# Patient Record
Sex: MALE | Race: WHITE
[De-identification: names, ages, dates, MRNs, and addresses within clinical notes are randomized per-mention and may not be internally consistent; named-entity substitution may affect disease eponyms.]

---

## 2017-03-28 ENCOUNTER — HOSPITAL ENCOUNTER (OUTPATIENT)
Dept: HOSPITAL 74 - FASU | Age: 77
Discharge: HOME | End: 2017-03-28
Attending: OPHTHALMOLOGY
Payer: COMMERCIAL

## 2017-03-28 VITALS — DIASTOLIC BLOOD PRESSURE: 68 MMHG | HEART RATE: 74 BPM | SYSTOLIC BLOOD PRESSURE: 154 MMHG

## 2017-03-28 VITALS — BODY MASS INDEX: 24.4 KG/M2

## 2017-03-28 VITALS — TEMPERATURE: 98.3 F

## 2017-03-28 DIAGNOSIS — H26.8: Primary | ICD-10-CM

## 2017-03-28 PROCEDURE — 08RJ3JZ REPLACEMENT OF RIGHT LENS WITH SYNTHETIC SUBSTITUTE, PERCUTANEOUS APPROACH: ICD-10-PCS | Performed by: OPHTHALMOLOGY

## 2017-03-28 RX ADMIN — PHENYLEPHRINE HYDROCHLORIDE ONE DROP: 0.25 SPRAY NASAL at 07:35

## 2017-03-28 RX ADMIN — CYCLOPENTOLATE HYDROCHLORIDE ONE DROP: 10 SOLUTION/ DROPS OPHTHALMIC at 07:30

## 2017-03-28 RX ADMIN — GENTAMICIN SULFATE ONE DROP: 3 SOLUTION/ DROPS OPHTHALMIC at 07:35

## 2017-03-28 RX ADMIN — FLURBIPROFEN SODIUM ONE DROP: 0.3 SOLUTION/ DROPS OPHTHALMIC at 07:30

## 2017-03-28 RX ADMIN — TROPICAMIDE ONE DROP: 10 SOLUTION/ DROPS OPHTHALMIC at 07:35

## 2017-03-28 RX ADMIN — TROPICAMIDE ONE DROP: 10 SOLUTION/ DROPS OPHTHALMIC at 07:25

## 2017-03-28 RX ADMIN — FLURBIPROFEN SODIUM ONE DROP: 0.3 SOLUTION/ DROPS OPHTHALMIC at 07:25

## 2017-03-28 RX ADMIN — FLURBIPROFEN SODIUM ONE DROP: 0.3 SOLUTION/ DROPS OPHTHALMIC at 07:35

## 2017-03-28 RX ADMIN — GENTAMICIN SULFATE ONE DROP: 3 SOLUTION/ DROPS OPHTHALMIC at 07:15

## 2017-03-28 RX ADMIN — PHENYLEPHRINE HYDROCHLORIDE ONE DROP: 0.25 SPRAY NASAL at 07:20

## 2017-03-28 RX ADMIN — FLURBIPROFEN SODIUM ONE DROP: 0.3 SOLUTION/ DROPS OPHTHALMIC at 07:15

## 2017-03-28 RX ADMIN — CYCLOPENTOLATE HYDROCHLORIDE ONE DROP: 10 SOLUTION/ DROPS OPHTHALMIC at 07:35

## 2017-03-28 RX ADMIN — GENTAMICIN SULFATE ONE DROP: 3 SOLUTION/ DROPS OPHTHALMIC at 07:20

## 2017-03-28 RX ADMIN — PHENYLEPHRINE HYDROCHLORIDE ONE DROP: 0.25 SPRAY NASAL at 07:25

## 2017-03-28 RX ADMIN — CYCLOPENTOLATE HYDROCHLORIDE ONE DROP: 10 SOLUTION/ DROPS OPHTHALMIC at 07:20

## 2017-03-28 RX ADMIN — TROPICAMIDE ONE DROP: 10 SOLUTION/ DROPS OPHTHALMIC at 07:15

## 2017-03-28 RX ADMIN — GENTAMICIN SULFATE ONE DROP: 3 SOLUTION/ DROPS OPHTHALMIC at 07:30

## 2017-03-28 RX ADMIN — CYCLOPENTOLATE HYDROCHLORIDE ONE DROP: 10 SOLUTION/ DROPS OPHTHALMIC at 07:15

## 2017-03-28 RX ADMIN — TROPICAMIDE ONE DROP: 10 SOLUTION/ DROPS OPHTHALMIC at 07:20

## 2017-03-28 RX ADMIN — TROPICAMIDE ONE DROP: 10 SOLUTION/ DROPS OPHTHALMIC at 07:30

## 2017-03-28 RX ADMIN — PHENYLEPHRINE HYDROCHLORIDE ONE DROP: 0.25 SPRAY NASAL at 07:30

## 2017-03-28 RX ADMIN — GENTAMICIN SULFATE ONE DROP: 3 SOLUTION/ DROPS OPHTHALMIC at 07:25

## 2017-03-28 RX ADMIN — PHENYLEPHRINE HYDROCHLORIDE ONE DROP: 0.25 SPRAY NASAL at 07:15

## 2017-03-28 RX ADMIN — FLURBIPROFEN SODIUM ONE DROP: 0.3 SOLUTION/ DROPS OPHTHALMIC at 07:20

## 2017-03-28 RX ADMIN — CYCLOPENTOLATE HYDROCHLORIDE ONE DROP: 10 SOLUTION/ DROPS OPHTHALMIC at 07:25

## 2017-03-28 NOTE — OP
DATE OF OPERATION:  03/28/2017

 

PREOPERATIVE DIAGNOSIS:  Cataract, right eye.  

 

POSTOPERATIVE DIAGNOSIS:  Cataract, right eye.  

 

PROCEDURE:  Cataract extraction via phacoemulsification with insertion of posterior

chamber lens implant, right eye.

 

SURGEON:  Tra Pollock M.D. 

 

ASSISTANT:  Nakia Philippe M.D. 

 

ANESTHESIA:  Regional with sedation.  

 

SPECIMENS:  None.

 

ESTIMATED BLOOD LOSS:  Less than 1 mL.  

 

COMPLICATIONS:  None.

 

PROCEDURE:  The patient is identified in the holding area after all risks, benefits,

and alternatives were explained to the patient, informed consent was obtained.  The

right eye was marked with a marking pen.  The patient then entered the operating room

on an eye stretcher.  After formal timeout was performed, a 3 mL injection of equal

parts 2% lidocaine with epinephrine and 0.5% Marcaine was given around the right eye.

 The right eye was then prepped and draped in the usual sterile fashion.  Eyelid

speculum was placed beneath the eyelids of the right eye.  A supratemporal

paracentesis incision was created using 15 degree blade.  Viscoelastic was injected

into the anterior chamber, and a 2.4-mm keratome blade was then used to make an

infratemporal incision.  A 360-degree continuous curvilinear capsulorrhexis was then

created using bent cystotome and Utrata forceps.  Hydrodissection was performed with

balanced saline solution on the cannula.  Phacoemulsification was then introduced,

disassembled and removed the nucleus in its entirety.  Irrigation/aspiration was then

used to remove any remaining cortical material from the eye.  The capsular bag was

then refilled using viscoelastic.  An Don model [id number] with a power of 22.0

diopters, serial number [id number] was inspected and found to be defect free and

injected into the capsular bag.  Once again, the power was 22.0 diopters. 

Irrigation/aspiration was then used to remove any remaining viscoelastic from the

eye.  The anterior chamber was then reformed using balanced saline solution. 

Intracameral injections of Miochol and Miostat were then given, and the pupil came

down and was round.  All wounds were hydrated with balanced saline solution and noted

to be watertight.  The anterior chamber was deep.  The lens was perfectly centered in

the capsular bag.  There was a red reflex present and the eye had an adequate

pressure.  Then topical antibiotic eyedrops and antibiotic ointment was then

administered to the right eye.  The eyelid speculum was then removed from the right

eye.  The right eye was then patched and shielded.  The patient tolerated the

procedure well and left the operating room in stable condition to follow up in the

eye clinic the next morning at 9 o'clock.  

 

 

TRA POLLOCK M.D.

 

[id number]

DD: 03/28/2017 09:14

DT: 03/28/2017 19:44

Job #:  [id number]

## 2017-05-16 ENCOUNTER — HOSPITAL ENCOUNTER (OUTPATIENT)
Dept: HOSPITAL 74 - FASU | Age: 77
Discharge: HOME | End: 2017-05-16
Attending: OPHTHALMOLOGY
Payer: COMMERCIAL

## 2017-05-16 VITALS — DIASTOLIC BLOOD PRESSURE: 71 MMHG | SYSTOLIC BLOOD PRESSURE: 153 MMHG

## 2017-05-16 VITALS — BODY MASS INDEX: 24.4 KG/M2

## 2017-05-16 VITALS — TEMPERATURE: 97.6 F | HEART RATE: 64 BPM

## 2017-05-16 DIAGNOSIS — H26.8: Primary | ICD-10-CM

## 2017-05-16 PROCEDURE — 08RK3JZ REPLACEMENT OF LEFT LENS WITH SYNTHETIC SUBSTITUTE, PERCUTANEOUS APPROACH: ICD-10-PCS | Performed by: OPHTHALMOLOGY

## 2017-05-16 RX ADMIN — TROPICAMIDE ONE DROP: 10 SOLUTION/ DROPS OPHTHALMIC at 08:20

## 2017-05-16 RX ADMIN — FLURBIPROFEN SODIUM ONE DROP: 0.3 SOLUTION/ DROPS OPHTHALMIC at 08:20

## 2017-05-16 RX ADMIN — PHENYLEPHRINE HYDROCHLORIDE ONE DROP: 0.25 SPRAY NASAL at 08:15

## 2017-05-16 RX ADMIN — FLURBIPROFEN SODIUM ONE DROP: 0.3 SOLUTION/ DROPS OPHTHALMIC at 08:25

## 2017-05-16 RX ADMIN — TROPICAMIDE ONE DROP: 10 SOLUTION/ DROPS OPHTHALMIC at 08:35

## 2017-05-16 RX ADMIN — TROPICAMIDE ONE DROP: 10 SOLUTION/ DROPS OPHTHALMIC at 08:15

## 2017-05-16 RX ADMIN — FLURBIPROFEN SODIUM ONE DROP: 0.3 SOLUTION/ DROPS OPHTHALMIC at 08:30

## 2017-05-16 RX ADMIN — CYCLOPENTOLATE HYDROCHLORIDE ONE DROP: 10 SOLUTION/ DROPS OPHTHALMIC at 08:15

## 2017-05-16 RX ADMIN — CYCLOPENTOLATE HYDROCHLORIDE ONE DROP: 10 SOLUTION/ DROPS OPHTHALMIC at 08:35

## 2017-05-16 RX ADMIN — CYCLOPENTOLATE HYDROCHLORIDE ONE DROP: 10 SOLUTION/ DROPS OPHTHALMIC at 08:30

## 2017-05-16 RX ADMIN — GENTAMICIN SULFATE ONE DROP: 3 SOLUTION/ DROPS OPHTHALMIC at 08:20

## 2017-05-16 RX ADMIN — CYCLOPENTOLATE HYDROCHLORIDE ONE DROP: 10 SOLUTION/ DROPS OPHTHALMIC at 08:20

## 2017-05-16 RX ADMIN — TROPICAMIDE ONE DROP: 10 SOLUTION/ DROPS OPHTHALMIC at 08:25

## 2017-05-16 RX ADMIN — GENTAMICIN SULFATE ONE DROP: 3 SOLUTION/ DROPS OPHTHALMIC at 08:30

## 2017-05-16 RX ADMIN — FLURBIPROFEN SODIUM ONE DROP: 0.3 SOLUTION/ DROPS OPHTHALMIC at 08:15

## 2017-05-16 RX ADMIN — PHENYLEPHRINE HYDROCHLORIDE ONE DROP: 0.25 SPRAY NASAL at 08:20

## 2017-05-16 RX ADMIN — PHENYLEPHRINE HYDROCHLORIDE ONE DROP: 0.25 SPRAY NASAL at 08:25

## 2017-05-16 RX ADMIN — GENTAMICIN SULFATE ONE DROP: 3 SOLUTION/ DROPS OPHTHALMIC at 08:35

## 2017-05-16 RX ADMIN — PHENYLEPHRINE HYDROCHLORIDE ONE DROP: 0.25 SPRAY NASAL at 08:30

## 2017-05-16 RX ADMIN — PHENYLEPHRINE HYDROCHLORIDE ONE DROP: 0.25 SPRAY NASAL at 08:35

## 2017-05-16 RX ADMIN — FLURBIPROFEN SODIUM ONE DROP: 0.3 SOLUTION/ DROPS OPHTHALMIC at 08:35

## 2017-05-16 RX ADMIN — TROPICAMIDE ONE DROP: 10 SOLUTION/ DROPS OPHTHALMIC at 08:30

## 2017-05-16 RX ADMIN — GENTAMICIN SULFATE ONE DROP: 3 SOLUTION/ DROPS OPHTHALMIC at 08:15

## 2017-05-16 RX ADMIN — CYCLOPENTOLATE HYDROCHLORIDE ONE DROP: 10 SOLUTION/ DROPS OPHTHALMIC at 08:25

## 2017-05-16 NOTE — OP
DATE OF OPERATION:  05/16/2017

 

PREOPERATIVE DIAGNOSIS:  Cataract, left eye.

 

POSTOPERATIVE DIAGNOSIS:  Cataract, left eye.

 

PROCEDURE:  Cataract extraction via phacoemulsification with insertion of posterior

chamber lens implant, left eye.

 

SURGEON:  Tra Pollock MD 

 

ASSISTANT:  Nakia Philippe MD 

 

ANESTHESIA:  Regional with sedation.

 

ESTIMATED BLOOD LOSS:  Less than 1 mL.

 

SPECIMENS:  None.

 

COMPLICATIONS:  None.

 

DESCRIPTION OF PROCEDURE:  The patient was identified in the holding area.  After all

risks, benefits, and alternatives were explained to the patient, informed consent was

obtained.  The left eye was marked with a marking pen.  The patient then entered the

operating room on an eye stretcher.  After formal time-out was performed, a 3 mL

injection of equal parts 2% lidocaine with epinephrine and 0.5% Marcaine was given

around the left eye.  The left eye was prepped and draped in the usual sterile

fashion.  An eyelid speculum was placed beneath the eyelid of the left eye.  A

inferotemporal paracentesis incision was created using a15-degree blade. 

Viscoelastic was injected into the anterior chamber.  A 2.4-mm keratome blade was

then used to make a superotemporal incision.  A 360-degree continuous curvilinear

capsulorrhexis was then created using thin cystotome and Utrata forceps. 

Hydrodissection was performed using balanced saline solution on the cannula. 

Phacoemulsification was introduced to disassemble and remove the nucleus in its

entirety.  Irrigation/aspiration was then used to remove any remaining cortical

material from the eye.  The capsular bag was then refilled using viscoelastic.  An

Don Model [id number] with a power of 22.0 diopter serial number [id number] was

inspected and found to be defect free and injected into the capsular bag. 

Irrigation/aspiration was then used to remove any remaining viscoelastic from the

eye.  The anterior chamber was then reformed using balanced saline solution. 

Intracameral injections of Miochol and Miostat were then given into the eye.  All

wounds were hydrated with balanced saline solution and found to be watertight.  The

eye had an adequate pressure.  The anterior chamber was deep.  There was a red reflex

present, and the lens was perfectly centered in the capsular bag.  Topical antibiotic

eyedrops and ointment were then administered to the right eye.  The left eye was

patched and shielded.  The patient tolerated the procedure well and left the

operating room in stable condition to follow up in the eye clinic the following

morning at 9 o'clock.

 

 

 

TRA POLLOCK M.D.

 

[id number]

DD: 05/16/2017 10:27

DT: 05/16/2017 10:50

Job #:  [id number]

## 2018-11-16 PROBLEM — Z00.00 ENCOUNTER FOR PREVENTIVE HEALTH EXAMINATION: Status: ACTIVE | Noted: 2018-11-16

## 2019-01-11 ENCOUNTER — RECORD ABSTRACTING (OUTPATIENT)
Age: 79
End: 2019-01-11

## 2019-01-11 DIAGNOSIS — Z78.9 OTHER SPECIFIED HEALTH STATUS: ICD-10-CM

## 2019-01-11 DIAGNOSIS — M19.012 PRIMARY OSTEOARTHRITIS, RIGHT SHOULDER: ICD-10-CM

## 2019-01-11 DIAGNOSIS — M19.011 PRIMARY OSTEOARTHRITIS, RIGHT SHOULDER: ICD-10-CM

## 2019-01-11 RX ORDER — FLUTICASONE FUROATE AND VILANTEROL TRIFENATATE 100; 25 UG/1; UG/1
100-25 POWDER RESPIRATORY (INHALATION)
Refills: 0 | Status: ACTIVE | COMMUNITY

## 2019-01-11 RX ORDER — ALBUTEROL SULFATE 90 UG/1
108 (90 BASE) AEROSOL, METERED RESPIRATORY (INHALATION)
Refills: 0 | Status: ACTIVE | COMMUNITY

## 2019-02-04 ENCOUNTER — APPOINTMENT (OUTPATIENT)
Dept: INTERNAL MEDICINE | Facility: CLINIC | Age: 79
End: 2019-02-04

## 2019-02-07 ENCOUNTER — APPOINTMENT (OUTPATIENT)
Dept: CARDIOLOGY | Facility: CLINIC | Age: 79
End: 2019-02-07
Payer: MEDICARE

## 2019-02-07 ENCOUNTER — NON-APPOINTMENT (OUTPATIENT)
Age: 79
End: 2019-02-07

## 2019-02-07 VITALS
OXYGEN SATURATION: 98 % | BODY MASS INDEX: 24.25 KG/M2 | HEART RATE: 74 BPM | WEIGHT: 183 LBS | HEIGHT: 73 IN | DIASTOLIC BLOOD PRESSURE: 77 MMHG | TEMPERATURE: 98.1 F | SYSTOLIC BLOOD PRESSURE: 160 MMHG

## 2019-02-07 DIAGNOSIS — Z78.9 OTHER SPECIFIED HEALTH STATUS: ICD-10-CM

## 2019-02-07 DIAGNOSIS — Z13.220 ENCOUNTER FOR SCREENING FOR LIPOID DISORDERS: ICD-10-CM

## 2019-02-07 PROCEDURE — 93000 ELECTROCARDIOGRAM COMPLETE: CPT

## 2019-02-07 PROCEDURE — 99203 OFFICE O/P NEW LOW 30 MIN: CPT

## 2019-02-07 NOTE — REASON FOR VISIT
[Consultation] : a consultation regarding [Hypertension] : hypertension [Palpitations] : palpitations

## 2019-02-08 PROBLEM — Z13.220 LIPID SCREENING: Status: RESOLVED | Noted: 2019-02-08 | Resolved: 2019-02-08

## 2019-02-08 PROBLEM — Z78.9 ALCOHOL USE: Status: ACTIVE | Noted: 2019-02-08

## 2019-02-08 NOTE — HISTORY OF PRESENT ILLNESS
[FreeTextEntry1] : Patient has no history of myocardial infarction or congestive heart failure or chest pain syndrome \par \par He however has had intermittent palpitations, usually when stressed.  He drinks 4 cups of coffee a day.\par He also describes being anxious at times with shaking hands. He said he's taken his wife's Xanax (w/o her knowing) and usually feels better\par \par Has no chest pain, shortness of breath\par No DAVY, PND or orthopnea\par \par Referring MD:  Dr Marianne Hernandez

## 2019-02-08 NOTE — PHYSICAL EXAM
[General Appearance - Well Developed] : well developed [General Appearance - Well Nourished] : well nourished [Normal Conjunctiva] : the conjunctiva exhibited no abnormalities [Heart Rate And Rhythm] : heart rate and rhythm were normal [Heart Sounds] : normal S1 and S2 [Murmurs] : no murmurs present [Auscultation Breath Sounds / Voice Sounds] : lungs were clear to auscultation bilaterally [Bowel Sounds] : normal bowel sounds [Abdomen Soft] : soft [Abnormal Walk] : normal gait [Skin Color & Pigmentation] : normal skin color and pigmentation [Oriented To Time, Place, And Person] : oriented to person, place, and time [FreeTextEntry1] : No JVD or bruits

## 2019-02-08 NOTE — DISCUSSION/SUMMARY
[FreeTextEntry1] : 1.  Palpitations\par Intermitttent palpitations, w/o real associated symptoms\par Normal TSH on 2/3/19\par May be anxiety-related, exacerbated by caffeine\par Rec:\par Reduce caffeine\par Holter for 24 hrs\par Echocardiogram to assess EF\par Return after above\par Consider anxiolytics\par \par 2.  PVCs\par PVCs on EKG from 12/18\par Rec:\par Echo and holter as mentioned\par Will consider stress testing at next visit\par \par 3.  HTN\par Uncontrolled recently, but wasn’'t taking meds adequately -> BP was 191/80\par BP today better, though not optimal\par Rec:\par Same meds\par Follow  BP at next visit\par \par

## 2019-02-19 ENCOUNTER — APPOINTMENT (OUTPATIENT)
Dept: CARDIOLOGY | Facility: CLINIC | Age: 79
End: 2019-02-19
Payer: MEDICARE

## 2019-02-19 PROCEDURE — 93306 TTE W/DOPPLER COMPLETE: CPT

## 2019-03-01 ENCOUNTER — APPOINTMENT (OUTPATIENT)
Dept: CARDIOLOGY | Facility: CLINIC | Age: 79
End: 2019-03-01

## 2019-03-19 ENCOUNTER — RX CHANGE (OUTPATIENT)
Age: 79
End: 2019-03-19

## 2019-04-26 ENCOUNTER — APPOINTMENT (OUTPATIENT)
Dept: UROLOGY | Facility: CLINIC | Age: 79
End: 2019-04-26
Payer: MEDICARE

## 2019-04-26 ENCOUNTER — RESULT REVIEW (OUTPATIENT)
Age: 79
End: 2019-04-26

## 2019-04-26 VITALS
HEIGHT: 73 IN | SYSTOLIC BLOOD PRESSURE: 174 MMHG | DIASTOLIC BLOOD PRESSURE: 80 MMHG | WEIGHT: 182 LBS | BODY MASS INDEX: 24.12 KG/M2 | HEART RATE: 80 BPM

## 2019-04-26 DIAGNOSIS — Z85.038 PERSONAL HISTORY OF OTHER MALIGNANT NEOPLASM OF LARGE INTESTINE: ICD-10-CM

## 2019-04-26 LAB
BACTERIA: 0
BILIRUB UR QL STRIP: NORMAL
CASTS: 0
CLARITY UR: CLEAR
COLLECTION METHOD: NORMAL
CRYSTALS: 0
DATE COLLECTED: NORMAL
EPITHELIAL CELLS: 0
GLUCOSE UR-MCNC: NORMAL
HCG UR QL: NORMAL EU/DL
HEMOCCULT SP1 STL QL: POSITIVE
HGB UR QL STRIP.AUTO: NORMAL
KETONES UR-MCNC: NORMAL
LEUKOCYTE ESTERASE UR QL STRIP: NORMAL
MUCUS: 0
NITRITE UR QL STRIP: NORMAL
PH UR STRIP: 5
PROT UR STRIP-MCNC: NORMAL
QUALITY CONTROL: YES
RBC CASTS # UR COMP ASSIST: NORMAL
SP GR UR STRIP: 1.01
WBC: NORMAL

## 2019-04-26 PROCEDURE — 82270 OCCULT BLOOD FECES: CPT

## 2019-04-26 PROCEDURE — 81000 URINALYSIS NONAUTO W/SCOPE: CPT

## 2019-04-26 PROCEDURE — 99205 OFFICE O/P NEW HI 60 MIN: CPT

## 2019-04-26 NOTE — ASSESSMENT
[FreeTextEntry1] : Dax Kearney is an 78-year-old gentleman who comes to the office today with a chief complaint of nocturia. This may be directly related to the fact that he is drinking before bed. This however does not explain his urinary urgency which appears to be associated with mild prostatic tenderness.  There is no evidence of infection, prostatic congestion, outlet obstruction secondary to BPH, urinary retention et cetera.  For this reason Mr. Kearney has been advised to take ibuprofen 200 mg 3 times a day for the next 5 days.\par \par Mild induration of the prostate gland suggested PSA should be repeated in 3 months.\par \par Also noted on examination today he was guaiac positive stool. The patient has a history of colon cancer with colon resection 15 years ago. He has not had colonoscopy for 8-9 years. GI evaluation has been advised.

## 2019-04-26 NOTE — PHYSICAL EXAM
[General Appearance - Well Nourished] : well nourished [Normal Appearance] : normal appearance [General Appearance - Well Developed] : well developed [Well Groomed] : well groomed [General Appearance - In No Acute Distress] : no acute distress [5th Left ICS - MCL] : palpated at the 5th LICS in the midclavicular line [Irregularly Irregular] : irregularly irregular [Normal Rate] : normal [Normal S1] : normal S1 [Normal S2] : normal S2 [No Murmur] : no murmurs heard [Rt] : varicose veins of the right leg noted [No Pitting Edema] : no pitting edema present [Chest Palpation] : palpation of the chest revealed no abnormalities [Auscultation Breath Sounds / Voice Sounds] : lungs were clear to auscultation bilaterally [Respiration, Rhythm And Depth] : normal respiratory rhythm and effort [Lungs Percussion] : the lungs were normal to percussion [Bowel Sounds] : normal bowel sounds [Abdomen Soft] : soft [Abdomen Tenderness] : non-tender [Abdomen Mass (___ Cm)] : no abdominal mass palpated [Costovertebral Angle Tenderness] : no ~M costovertebral angle tenderness [Size (2+)] : size was 2+ [Abdomen Hernia] : no hernia was discovered [Rectal Exam - Prostate] : was indurated [Prostate Tenderness] : was tender [Rectal Exam - Seminal Vesicles] : was normal [Occult Blood Positive] : exam was positive for occult blood [Rectal Tenderness] : rectal tenderness [Nl Perineum] : perineum was normal on inspection [Normal] : normal [Testes] : normal [Epididymis] : was normal [Normal Station and Gait] : the gait and station were normal for the patient's age [Skin Lesions] : no skin lesions [] : no rash [Oriented To Time, Place, And Person] : oriented to person, place, and time [Affect] : the affect was normal [Mood] : the mood was normal [Not Anxious] : not anxious [No Palpable Adenopathy] : no palpable adenopathy [No Focal Deficits] : no focal deficits [FreeTextEntry1] : HEALTHY FIT LOOKING 77YO MAN [Prostate Fluctuant] : was not fluctuant [Prostate Hard Area Or Nodule Bilaterally] : had no palpable nodules [Mass___cm] : no rectal masses [Phimosis] : no phimosis [Discharge] : no discharge [Circumcised] : the penis was uncircumcised

## 2019-04-26 NOTE — HISTORY OF PRESENT ILLNESS
[FreeTextEntry1] : (New Patient)\par \par Dax Kearney is a 78y who comes to the office today with a chief complaint of nocturia x3 for the past year or more.   He also appears to have urinary urgency for the past 6-8 months.  Incontinence is not a problem nor is frequency.\par \par PERTINENT UROLOGIC HISTORY: \par \par 03/05/19: PSA 2.66; free PSA of 18%\par \par CURRENT UROLOGIC REVIEW OF SYSTEMS:\par \par Incontinence Assessed?.  YES\par \par Nocturia:  (+) 2-4, AVERAGE 3 x/night DRINKING BEFORE BED (WAS ADVISED TO DO SO BY MD AT OOD)\par Frequency: (-)  3-4   x/day    \par Dysuria: (-)\par Urgency:  (+)  X 6-8 MONTH\par Hesitancy:  (-)\par Intermittency:  (-)\par Sensation of Incomplete Voiding :  (-)\par Double voiding :  (-)\par Stress incontinence:  (-)\par Urge incontinence:  (+)  SMALL VOLUME 1-2X/ MONTH\par Use of absorbent pads:  (-)\par Terminal dribbling:  (-)\par Status of stream: "STRONG"\par Venereal disease:  (-)\par Kidney stones:  (-)\par UTIs:  (-)\par Prior urologic surgery:  (-)\par Hematuria:  (-)\par Abdominal pressure:  (-)\par Back pain:  (-)\par Sexual Status: erections occur but not firm enough for penetration; problematic for at least 1 year. Tried Viagra in the past ("1/2  of 100mg pill" but can't tell me if the medication worked)\par Gout:  (-)\par Renal problems:  (-)\par Family History of Urologic Problems:  (-)\par

## 2019-04-28 LAB — BACTERIA UR CULT: NORMAL

## 2019-06-12 ENCOUNTER — APPOINTMENT (OUTPATIENT)
Dept: GASTROENTEROLOGY | Facility: CLINIC | Age: 79
End: 2019-06-12

## 2019-07-05 ENCOUNTER — LABORATORY RESULT (OUTPATIENT)
Age: 79
End: 2019-07-05

## 2019-07-05 ENCOUNTER — APPOINTMENT (OUTPATIENT)
Dept: PULMONOLOGY | Facility: CLINIC | Age: 79
End: 2019-07-05
Payer: MEDICARE

## 2019-07-05 VITALS
WEIGHT: 179 LBS | BODY MASS INDEX: 23.72 KG/M2 | SYSTOLIC BLOOD PRESSURE: 138 MMHG | OXYGEN SATURATION: 96 % | DIASTOLIC BLOOD PRESSURE: 80 MMHG | HEIGHT: 73 IN | HEART RATE: 83 BPM

## 2019-07-05 PROCEDURE — 99205 OFFICE O/P NEW HI 60 MIN: CPT | Mod: 25

## 2019-07-05 PROCEDURE — 94010 BREATHING CAPACITY TEST: CPT

## 2019-07-05 NOTE — PHYSICAL EXAM
[General Appearance - In No Acute Distress] : no acute distress [Low Lying Soft Palate] : low lying soft palate [Elongated Uvula] : elongated uvula [IV] : IV [Erythema] : erythema of the pharynx [Enlarged Base of the Tongue] : enlargement of the base of the tongue [] : the neck was supple [Neck Appearance] : the appearance of the neck was normal [Jugular Venous Distention Increased] : there was no jugular-venous distention [Heart Sounds] : normal S1 and S2 [Murmurs] : no murmurs present [Respiration, Rhythm And Depth] : normal respiratory rhythm and effort [Exaggerated Use Of Accessory Muscles For Inspiration] : no accessory muscle use [Scoliosis] : scoliosis [Bowel Sounds] : normal bowel sounds [Abdomen Soft] : soft [Nail Clubbing] : no clubbing of the fingernails [Abnormal Walk] : normal gait [Cyanosis, Localized] : no localized cyanosis [Skin Color & Pigmentation] : normal skin color and pigmentation [Cranial Nerves] : cranial nerves 2-12 were intact [No Focal Deficits] : no focal deficits [Oriented To Time, Place, And Person] : oriented to person, place, and time [Affect] : the affect was normal [FreeTextEntry1] : no edema

## 2019-07-05 NOTE — CONSULT LETTER
[FreeTextEntry1] : Thank you for allowing me to consult on [facility]  for Asthma/COPD.  Please see my note below.\par \par   [___] : [unfilled] [FreeTextEntry3] : Thank you very much for allowing me to consult on your patient.  If you have any questions, please do not hesitate to contact me.\par \par Sincerely,\par \par Froylan Cruz MD\par Pulmonary and Sleep Medicine\par

## 2019-07-05 NOTE — ASSESSMENT
[FreeTextEntry1] : above was discussed at length with the patient was an excellent understanding of the issues

## 2019-07-05 NOTE — DISCUSSION/SUMMARY
[FreeTextEntry1] : spirometry today: FEV1 [1.73]L  [50]% Predicted [52] FEV1/FVC\par PFTs 4/29/2019 FEV1 1.75 (56% predicted FEV1/FVC 51, no significant change postBD, TLC 93% % RV/% DLCO 70%

## 2019-07-05 NOTE — REVIEW OF SYSTEMS
[Eye Irritation] : ~T irritation of the eyes [Cough] : cough [Sinus Problems] : sinus problems [Dyspnea] : dyspnea [Palpitations] : palpitations [Watery Eyes] : ~T eyes watering / discharge [As Noted in HPI] : as noted in HPI [Snoring] : snoring [Negative] : Pulmonary Hypertension [Sputum] : not coughing up ~M sputum [Postnasal Drip] : no postnasal drip [Chest Tightness] : no chest tightness [Pleuritic Pain] : no pleuritic pain [Wheezing] : no wheezing

## 2019-07-05 NOTE — HISTORY OF PRESENT ILLNESS
[Feelings Of Weakness On Exertion] : denies exercise intolerance [Difficulty Breathing During Exertion] : denies dyspnea on exertion [Wheezing] : denies wheezing [Cough] : denies coughing [Regional Soft Tissue Swelling Both Lower Extremities] : denies lower extremity edema [Fever] : denies fever [Chest Pain Or Discomfort] : denies chest pain [0  -  Nothing at all] : 0, nothing at all [Former] : is a former smoker [Class I - No Symptoms and No Limitations] : I [___ Year Quit] : ~He/She~ quit smoking in [unfilled] [___ Pack Year History] : [unfilled] pack year history [Snoring] : snoring [Wt Gain ___ Lbs] : no recent weight gain [Wt Loss ___ Lbs] : no recent weight loss [de-identified] : 2.5PPD x 15 years [Oxygen] : the patient uses no supplemental oxygen [FreeTextEntry1] : I was asked to consult on this patient by Dr. Hernandez for asthma/COPD. \par The patient is an 79yo [place] M former 45 -pack-year smoker (2.5 PPD X 15 years) quit smoking in 1990 that was a  for 45 years. He claims he breathes well but there are times he is short of breath and he takes his albuterol inhaler which causes palpitations. Using his inhaler once or twice per day but denies any chest pain pressure or tightness and he does not hear himself wheeze. He was hospitalized at [facility] approximately 3 weeks ago for hypertension and he was told then that he had bronchitis. He does have allergic reactions to pollens specifically watery and itchy eyes for which he takes eyedrops. He denies any leg swelling fevers or chills.

## 2019-07-10 LAB
ALBUMIN SERPL ELPH-MCNC: 4.3 G/DL
ALP BLD-CCNC: 77 U/L
ALT SERPL-CCNC: 18 U/L
ANION GAP SERPL CALC-SCNC: 11 MMOL/L
AST SERPL-CCNC: 20 U/L
BASOPHILS # BLD AUTO: 0.04 K/UL
BASOPHILS NFR BLD AUTO: 0.6 %
BILIRUB SERPL-MCNC: 0.6 MG/DL
BUN SERPL-MCNC: 21 MG/DL
CALCIUM SERPL-MCNC: 9.8 MG/DL
CHLORIDE SERPL-SCNC: 106 MMOL/L
CO2 SERPL-SCNC: 25 MMOL/L
CREAT SERPL-MCNC: 0.89 MG/DL
EOSINOPHIL # BLD AUTO: 0.27 K/UL
EOSINOPHIL NFR BLD AUTO: 4.3 %
GLUCOSE SERPL-MCNC: 106 MG/DL
HCT VFR BLD CALC: 48.2 %
HGB BLD-MCNC: 15.3 G/DL
IMM GRANULOCYTES NFR BLD AUTO: 0.2 %
LYMPHOCYTES # BLD AUTO: 1.4 K/UL
LYMPHOCYTES NFR BLD AUTO: 22.4 %
MAN DIFF?: NORMAL
MCHC RBC-ENTMCNC: 31.4 PG
MCHC RBC-ENTMCNC: 31.7 GM/DL
MCV RBC AUTO: 98.8 FL
MONOCYTES # BLD AUTO: 0.5 K/UL
MONOCYTES NFR BLD AUTO: 8 %
NEUTROPHILS # BLD AUTO: 4.03 K/UL
NEUTROPHILS NFR BLD AUTO: 64.5 %
PLATELET # BLD AUTO: 146 K/UL
POTASSIUM SERPL-SCNC: 4.2 MMOL/L
PROT SERPL-MCNC: 6.5 G/DL
RBC # BLD: 4.88 M/UL
RBC # FLD: 12.8 %
SODIUM SERPL-SCNC: 142 MMOL/L
WBC # FLD AUTO: 6.25 K/UL

## 2019-07-18 LAB
A ALTERNATA IGE QN: <0.1 KUA/L
A FUMIGATUS IGE QN: <0.1 KUA/L
BERMUDA GRASS IGE QN: <0.1 KUA/L
BOXELDER IGE QN: <0.1 KUA/L
C HERBARUM IGE QN: <0.1 KUA/L
CALIF WALNUT IGE QN: <0.1 KUA/L
CAT DANDER IGE QN: <0.1 KUA/L
CMN PIGWEED IGE QN: <0.1 KUA/L
COMMON RAGWEED IGE QN: <0.1 KUA/L
COTTONWOOD IGE QN: <0.1 KUA/L
D FARINAE IGE QN: <0.1 KUA/L
D PTERONYSS IGE QN: <0.1 KUA/L
DEPRECATED A ALTERNATA IGE RAST QL: 0
DEPRECATED A FUMIGATUS IGE RAST QL: 0
DEPRECATED BERMUDA GRASS IGE RAST QL: 0
DEPRECATED BOXELDER IGE RAST QL: 0
DEPRECATED C HERBARUM IGE RAST QL: 0
DEPRECATED CAT DANDER IGE RAST QL: 0
DEPRECATED COMMON PIGWEED IGE RAST QL: 0
DEPRECATED COMMON RAGWEED IGE RAST QL: 0
DEPRECATED COTTONWOOD IGE RAST QL: 0
DEPRECATED D FARINAE IGE RAST QL: 0
DEPRECATED D PTERONYSS IGE RAST QL: 0
DEPRECATED DOG DANDER IGE RAST QL: 0
DEPRECATED GOOSEFOOT IGE RAST QL: 0
DEPRECATED LONDON PLANE IGE RAST QL: 0
DEPRECATED MUGWORT IGE RAST QL: 0
DEPRECATED P NOTATUM IGE RAST QL: 0
DEPRECATED RED CEDAR IGE RAST QL: 0
DEPRECATED ROACH IGE RAST QL: NORMAL
DEPRECATED SHEEP SORREL IGE RAST QL: 0
DEPRECATED SILVER BIRCH IGE RAST QL: 0
DEPRECATED TIMOTHY IGE RAST QL: 0
DEPRECATED WHITE ASH IGE RAST QL: 0
DEPRECATED WHITE OAK IGE RAST QL: 0
DOG DANDER IGE QN: <0.1 KUA/L
GOOSEFOOT IGE QN: <0.1 KUA/L
LONDON PLANE IGE QN: <0.1 KUA/L
MUGWORT IGE QN: <0.1 KUA/L
MULBERRY (T70) CLASS: 0
MULBERRY (T70) CONC: <0.1 KUA/L
P NOTATUM IGE QN: <0.1 KUA/L
RED CEDAR IGE QN: <0.1 KUA/L
ROACH IGE QN: 0.18 KUA/L
SHEEP SORREL IGE QN: <0.1 KUA/L
SILVER BIRCH IGE QN: <0.1 KUA/L
TIMOTHY IGE QN: <0.1 KUA/L
TOTAL IGE SMQN RAST: 20 KU/L
TREE ALLERG MIX1 IGE QL: 0
WHITE ASH IGE QN: <0.1 KUA/L
WHITE ELM IGE QN: 0
WHITE ELM IGE QN: <0.1 KUA/L
WHITE OAK IGE QN: <0.1 KUA/L

## 2019-07-19 ENCOUNTER — APPOINTMENT (OUTPATIENT)
Dept: UROLOGY | Facility: CLINIC | Age: 79
End: 2019-07-19

## 2019-07-19 NOTE — HISTORY OF PRESENT ILLNESS
[FreeTextEntry1] : (Last seen 04/26/19)\par \par Dax Kearney is an 78-year-old gentleman first seen in the office on April 26, 2019 when he presented with a with a chief complaint of nocturia. This appeared to be directly related to the fact that he was drinking before bed. This however did not explain his urinary urgency which appeared to be associated with mild prostatic tenderness. There is no evidence of infection, prostatic congestion, significant outlet obstruction or suggestion urinary retention. 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 Kearney was been advised to take ibuprofen 200 mg 3 times a day for  5 days.  Diminish fluid consumption before bed was also advised. Finally the suggestion of mild induration of the prostate gland suggested PSA should be repeated in 3 months.  He returns to the office for this, and reevaluation of his voiding status, today.\par \par Also noted on his last examination was the presence of guaiac positive stool. The patient' s  history of colon cancer with colon resection 15 years earlier, (combined with the fact that he not had colonoscopy for 8-9 years) GI evaluation had also been advised.

## 2019-08-19 ENCOUNTER — APPOINTMENT (OUTPATIENT)
Dept: GASTROENTEROLOGY | Facility: CLINIC | Age: 79
End: 2019-08-19

## 2019-09-06 ENCOUNTER — RESULT REVIEW (OUTPATIENT)
Age: 79
End: 2019-09-06

## 2019-09-09 ENCOUNTER — APPOINTMENT (OUTPATIENT)
Dept: PULMONOLOGY | Facility: CLINIC | Age: 79
End: 2019-09-09
Payer: MEDICARE

## 2019-09-09 VITALS
WEIGHT: 181 LBS | HEART RATE: 69 BPM | HEIGHT: 73 IN | SYSTOLIC BLOOD PRESSURE: 152 MMHG | OXYGEN SATURATION: 99 % | BODY MASS INDEX: 23.99 KG/M2 | DIASTOLIC BLOOD PRESSURE: 80 MMHG

## 2019-09-09 PROCEDURE — 99214 OFFICE O/P EST MOD 30 MIN: CPT

## 2019-09-09 NOTE — ASSESSMENT
[FreeTextEntry1] : above was discussed at length with the patient and explained in [place]iny[place]anda and has an excellent understanding of the issues

## 2019-09-09 NOTE — REVIEW OF SYSTEMS
[Cough] : cough [Dyspnea] : dyspnea [As Noted in HPI] : as noted in HPI [Snoring] : snoring [Negative] : Pulmonary Hypertension [Postnasal Drip] : no postnasal drip [Eye Irritation] : no ~T irritation of the eyes [Sputum] : not coughing up ~M sputum [Chest Tightness] : no chest tightness [Sinus Problems] : no sinus problems [Pleuritic Pain] : no pleuritic pain [Wheezing] : no wheezing [Watery Eyes] : no discharge from the eyes [Palpitations] : no palpitations

## 2019-09-09 NOTE — HISTORY OF PRESENT ILLNESS
[Improved] : have improved [Difficulty Breathing During Exertion] : denies dyspnea on exertion [Feelings Of Weakness On Exertion] : denies exercise intolerance [Cough] : denies coughing [Wheezing] : denies wheezing [Regional Soft Tissue Swelling Both Lower Extremities] : denies lower extremity edema [Fever] : denies fever [Chest Pain Or Discomfort] : denies chest pain [0  -  Nothing at all] : 0, nothing at all [Class I - No Symptoms and No Limitations] : I [___ Year Quit] : ~He/She~ quit smoking in [unfilled] [___ Pack Year History] : [unfilled] pack year history [Snoring] : snoring [Wt Gain ___ Lbs] : no recent weight gain [Wt Loss ___ Lbs] : no recent weight loss [Oxygen] : the patient uses no supplemental oxygen [de-identified] : 2.5PPD x 15 years [FreeTextEntry1] : The patient returned today for f/u of his SOB/ COPD. He is on Breo because trelegy was too expensive. His cough and shortness of breath are better. He is now walking a mile. His throat pain has resolved bu the still has palpitations when he uses proventil which he uses 1x/day. He had a chest x-ray this morning

## 2020-10-13 ENCOUNTER — APPOINTMENT (OUTPATIENT)
Dept: PULMONOLOGY | Facility: CLINIC | Age: 80
End: 2020-10-13
Payer: MEDICARE

## 2020-10-13 VITALS
HEART RATE: 81 BPM | TEMPERATURE: 97.4 F | DIASTOLIC BLOOD PRESSURE: 80 MMHG | WEIGHT: 178 LBS | BODY MASS INDEX: 23.59 KG/M2 | HEIGHT: 73 IN | OXYGEN SATURATION: 97 % | SYSTOLIC BLOOD PRESSURE: 164 MMHG

## 2020-10-13 PROCEDURE — 99214 OFFICE O/P EST MOD 30 MIN: CPT

## 2020-10-13 NOTE — PHYSICAL EXAM
[No Acute Distress] : no acute distress [Low Lying Soft Palate] : low lying soft palate [Erythema] : erythema [Normal Appearance] : normal appearance [No Neck Mass] : no neck mass [Normal Rate/Rhythm] : normal rate/rhythm [Normal S1, S2] : normal s1, s2 [No Murmurs] : no murmurs [Rhonchi] : rhonchi [No Abnormalities] : no abnormalities [Benign] : benign [Normal Gait] : normal gait [No Clubbing] : no clubbing [No Cyanosis] : no cyanosis [No Edema] : no edema [FROM] : FROM [Normal Color/ Pigmentation] : normal color/ pigmentation [No Focal Deficits] : no focal deficits [Oriented x3] : oriented x3 [Normal Affect] : normal affect [TextBox_68] : ant but clear posteriorly

## 2020-10-13 NOTE — HISTORY OF PRESENT ILLNESS
[Former] : former [>= 30 pack years] : >= 30 pack years [Never] : never [TextBox_4] : Patient has been on BREO because insurance would not pay for TRELEGY. Despite being on BREO he still requires albuterol often especially in the morning. Once he starts to walk he feels much better he denies any weight gain he has occasional morning phlegm that's white but denies any chest pain pressure or tightness. He notices drinking beer triggers his shortness of breath but he can have 2 whiskeys without difficulty. He treated his ability keep the weight down by walking so much [TextBox_11] : 2.5 [TextBox_13] : 15 [YearQuit] : 1990

## 2021-03-02 ENCOUNTER — NON-APPOINTMENT (OUTPATIENT)
Age: 81
End: 2021-03-02

## 2021-03-03 ENCOUNTER — NON-APPOINTMENT (OUTPATIENT)
Age: 81
End: 2021-03-03

## 2021-03-03 ENCOUNTER — APPOINTMENT (OUTPATIENT)
Dept: CARDIOLOGY | Facility: CLINIC | Age: 81
End: 2021-03-03
Payer: MEDICARE

## 2021-03-03 VITALS
RESPIRATION RATE: 12 BRPM | HEART RATE: 77 BPM | TEMPERATURE: 98 F | DIASTOLIC BLOOD PRESSURE: 70 MMHG | OXYGEN SATURATION: 97 % | WEIGHT: 183 LBS | BODY MASS INDEX: 24.14 KG/M2 | SYSTOLIC BLOOD PRESSURE: 154 MMHG

## 2021-03-03 DIAGNOSIS — N52.9 MALE ERECTILE DYSFUNCTION, UNSPECIFIED: ICD-10-CM

## 2021-03-03 DIAGNOSIS — Z87.09 PERSONAL HISTORY OF OTHER DISEASES OF THE RESPIRATORY SYSTEM: ICD-10-CM

## 2021-03-03 DIAGNOSIS — Z87.898 PERSONAL HISTORY OF OTHER SPECIFIED CONDITIONS: ICD-10-CM

## 2021-03-03 DIAGNOSIS — Z78.9 OTHER SPECIFIED HEALTH STATUS: ICD-10-CM

## 2021-03-03 DIAGNOSIS — N40.1 BENIGN PROSTATIC HYPERPLASIA WITH LOWER URINARY TRACT SYMPMS: ICD-10-CM

## 2021-03-03 DIAGNOSIS — R19.5 OTHER FECAL ABNORMALITIES: ICD-10-CM

## 2021-03-03 DIAGNOSIS — B02.29 OTHER POSTHERPETIC NERVOUS SYSTEM INVOLVEMENT: ICD-10-CM

## 2021-03-03 DIAGNOSIS — Z87.891 PERSONAL HISTORY OF NICOTINE DEPENDENCE: ICD-10-CM

## 2021-03-03 PROCEDURE — 99214 OFFICE O/P EST MOD 30 MIN: CPT

## 2021-03-03 PROCEDURE — 93242 EXT ECG>48HR<7D RECORDING: CPT

## 2021-03-03 PROCEDURE — 99072 ADDL SUPL MATRL&STAF TM PHE: CPT

## 2021-03-03 PROCEDURE — 93000 ELECTROCARDIOGRAM COMPLETE: CPT | Mod: 59

## 2021-03-04 PROBLEM — Z87.898 HISTORY OF NOCTURIA: Status: RESOLVED | Noted: 2019-04-26 | Resolved: 2021-03-04

## 2021-03-04 PROBLEM — N52.9 ORGANIC ERECTILE DYSFUNCTION: Status: RESOLVED | Noted: 2019-04-26 | Resolved: 2021-03-04

## 2021-03-04 PROBLEM — B02.29 POST HERPETIC NEURALGIA: Status: RESOLVED | Noted: 2021-03-04 | Resolved: 2021-03-04

## 2021-03-04 PROBLEM — Z87.898 HISTORY OF URINARY URGENCY: Status: RESOLVED | Noted: 2019-04-26 | Resolved: 2021-03-04

## 2021-03-04 PROBLEM — Z78.9 CAFFEINE USE: Status: ACTIVE | Noted: 2021-03-04

## 2021-03-04 PROBLEM — Z87.891 FORMER SMOKER: Status: ACTIVE | Noted: 2019-01-11

## 2021-03-04 PROBLEM — Z87.09 HISTORY OF ASTHMA: Status: RESOLVED | Noted: 2019-01-11 | Resolved: 2021-03-04

## 2021-03-04 PROBLEM — R19.5 GUAIAC POSITIVE STOOLS: Status: RESOLVED | Noted: 2019-04-26 | Resolved: 2021-03-04

## 2021-03-04 PROBLEM — N40.1 BENIGN PROSTATIC HYPERPLASIA WITH LOWER URINARY TRACT SYMPTOMS: Status: RESOLVED | Noted: 2019-04-26 | Resolved: 2021-03-04

## 2021-03-04 PROCEDURE — 93244 EXT ECG>48HR<7D REV&INTERPJ: CPT

## 2021-03-04 PROCEDURE — 99072 ADDL SUPL MATRL&STAF TM PHE: CPT

## 2021-03-04 RX ORDER — TAMSULOSIN HYDROCHLORIDE 0.4 MG/1
0.4 CAPSULE ORAL
Refills: 0 | Status: ACTIVE | COMMUNITY

## 2021-03-04 NOTE — PHYSICAL EXAM
[General Appearance - Well Developed] : well developed [General Appearance - Well Nourished] : well nourished [General Appearance - In No Acute Distress] : no acute distress [Normal Conjunctiva] : the conjunctiva exhibited no abnormalities [No Oral Cyanosis] : no oral cyanosis [Normal Rate] : normal [Premature Beats] : regular with premature beats [Normal S1] : normal S1 [Normal S2] : normal S2 [No Murmur] : no murmurs heard [2+] : left 2+ [No Pitting Edema] : no pitting edema present [] : no respiratory distress [Respiration, Rhythm And Depth] : normal respiratory rhythm and effort [Auscultation Breath Sounds / Voice Sounds] : lungs were clear to auscultation bilaterally [Abnormal Walk] : normal gait [Nail Clubbing] : no clubbing of the fingernails [Cyanosis, Localized] : no localized cyanosis [Oriented To Time, Place, And Person] : oriented to person, place, and time [Affect] : the affect was normal [Mood] : the mood was normal [FreeTextEntry1] : No JVD present [S3] : no S3 [S4] : no S4 [Right Carotid Bruit] : no bruit heard over the right carotid [Left Carotid Bruit] : no bruit heard over the left carotid

## 2021-03-04 NOTE — ASSESSMENT
[FreeTextEntry1] : 81 yo male with hypertension, who was referred for evaluation of reported "atrial fibrillation" per 3/1/21 ECG at [facility]. Patient with intermittent palpitations and known history of PVCs. \par ECG from 3/1/21 at [facility] was reviewed today and DOES NOT show atrial fibrillation, but rather sinus rhythm with PACs.\par ECG today demonstrated sinus rhythm with RBBB, PVC, PAC, and LAD.\par CMP and TSH on 3/1/21 was normal.\par \par Zio XT monitor was placed in the office today to evaluate for arrhythmias over 48 hours & determine PVC burden.\par Will perform echocardiogram to assess LV function and structural heart disease.\par Pending test results, will determine if further cardiac work-up or intervention is clinically indicated.\par Patient was advised to reduce his caffeine consumption as this is likely contributing to his palpitations.\par \par BP is mildly elevated today.\par Patient was advised to adhere to low salt diet and reduce caffein consumption.\par Will continue to monitor on amlodipine 10 mg po daily and lisinopril 40 mg po daily.

## 2021-03-04 NOTE — HISTORY OF PRESENT ILLNESS
[FreeTextEntry1] : 81 yo male with hypertension who presents today as referral by PMD for evaluation of reported "atrial fibrillation" per 3/1/21 ECG at [facility]. Patient reports occasional palpitations and has known history of PVCs. He drinks 2-3 cups of espresso daily. Patient denies chest pain, dyspnea, lightheadedness, fatigue, syncope, edema, melena, hematochezia, or hematemesis.\par \par PMD: Adrian Paz MD ([facility])

## 2021-03-04 NOTE — REASON FOR VISIT
[Follow-Up - Clinic] : a clinic follow-up of [Palpitations] : palpitations [FreeTextEntry1] : reported "atrial fibrillation" per 3/1/21 ECG at [facility]

## 2021-03-12 ENCOUNTER — NON-APPOINTMENT (OUTPATIENT)
Age: 81
End: 2021-03-12

## 2021-04-18 ENCOUNTER — RESULT REVIEW (OUTPATIENT)
Age: 81
End: 2021-04-18

## 2021-05-17 ENCOUNTER — APPOINTMENT (OUTPATIENT)
Dept: CARDIOLOGY | Facility: CLINIC | Age: 81
End: 2021-05-17

## 2021-05-25 ENCOUNTER — APPOINTMENT (OUTPATIENT)
Dept: CARDIOLOGY | Facility: CLINIC | Age: 81
End: 2021-05-25

## 2021-05-27 NOTE — HISTORY OF PRESENT ILLNESS
[FreeTextEntry1] : 81 yo male with hypertension who presents today as referral by PMD for evaluation of reported "atrial fibrillation" per 3/1/21 ECG at [facility]. Patient reports occasional palpitations and has known history of PVCs. He drinks 2-3 cups of espresso daily. Patient denies chest pain, dyspnea, lightheadedness, fatigue, syncope, edema, melena, hematochezia, or hematemesis.\par \par PMD: Adrian Paz MD ([facility])

## 2021-05-27 NOTE — ASSESSMENT
[FreeTextEntry1] : 79 yo male with hypertension, who was referred for evaluation of reported "atrial fibrillation" per 3/1/21 ECG at [facility]. Patient with intermittent palpitations and known history of PVCs. \par ECG from 3/1/21 at [facility] was reviewed today and DOES NOT show atrial fibrillation, but rather sinus rhythm with PACs.\par ECG today demonstrated sinus rhythm with RBBB, PVC, PAC, and LAD.\par CMP and TSH on 3/1/21 was normal.\par \par Zio XT monitor was placed in the office today to evaluate for arrhythmias over 48 hours & determine PVC burden.\par Will perform echocardiogram to assess LV function and structural heart disease.\par Pending test results, will determine if further cardiac work-up or intervention is clinically indicated.\par Patient was advised to reduce his caffeine consumption as this is likely contributing to his palpitations.\par \par BP is mildly elevated today.\par Patient was advised to adhere to low salt diet and reduce caffeine consumption.\par Will continue to monitor on amlodipine 10 mg po daily and lisinopril 40 mg po daily.

## 2021-05-27 NOTE — CARDIOLOGY SUMMARY
[de-identified] : Zio XT monitor (3/3-3/4/21): \par 1) Sinus rhythm with average HR 71 bpm (range  bpm). \par 2) Nine non-sustained runs of supraventricular tachycardia. The longest runs was ~ 20 seconds with max HR of 171 bpm. \par 3) Frequent PACs. Rare atrial couplets/triplets.\par 4) Occasional PVCs and rare ventricular couplets.\par \par 3/3/21 ECG: Sinus, rate 77 bpm, PVC, PAC, RBBB, LAD\par 3/1/21 ECG (from [facility]): Reported as "Atrial fibrillation" but is really sinus rhythm with PACs, RBBB, LAFB\par  [de-identified] : 4/19/21 Echo (at [facility]): MIldly increased LV size. Low normal LV systolic function. LVEF 50-55%. Grade II diastolic dysfunction. LA volume index 42 ml/m2. MV thickening and MAC. Mild to mod MR. AV sclerosis. Mild to mod AR. Mild TR. Mild IN. Normal PASP. Mildly dilated aortic root (3.9 cm).\par \par 2/19/19 Echo: Normal LV size and systolic function, LVEF 60%. Grade II DD. Mild conc LVH. Severely increased LA volume index. Mild AV sclerosis. Mod AR. Mild MR. Mild TR.

## 2021-08-12 ENCOUNTER — NON-APPOINTMENT (OUTPATIENT)
Age: 81
End: 2021-08-12

## 2021-08-17 ENCOUNTER — APPOINTMENT (OUTPATIENT)
Dept: CARDIOLOGY | Facility: CLINIC | Age: 81
End: 2021-08-17
Payer: MEDICARE

## 2021-08-17 VITALS
RESPIRATION RATE: 12 BRPM | HEIGHT: 72 IN | OXYGEN SATURATION: 98 % | WEIGHT: 183 LBS | HEART RATE: 100 BPM | SYSTOLIC BLOOD PRESSURE: 168 MMHG | BODY MASS INDEX: 24.79 KG/M2 | DIASTOLIC BLOOD PRESSURE: 88 MMHG

## 2021-08-17 DIAGNOSIS — Z86.79 PERSONAL HISTORY OF OTHER DISEASES OF THE CIRCULATORY SYSTEM: ICD-10-CM

## 2021-08-17 DIAGNOSIS — I49.3 VENTRICULAR PREMATURE DEPOLARIZATION: ICD-10-CM

## 2021-08-17 DIAGNOSIS — Z87.898 PERSONAL HISTORY OF OTHER SPECIFIED CONDITIONS: ICD-10-CM

## 2021-08-17 PROCEDURE — 99214 OFFICE O/P EST MOD 30 MIN: CPT

## 2021-08-17 PROCEDURE — 93000 ELECTROCARDIOGRAM COMPLETE: CPT

## 2021-08-17 NOTE — HISTORY OF PRESENT ILLNESS
[FreeTextEntry1] : 82 yo male with hypertension who presents today for evaluation of vertigo over the past 2 weeks for which he has been taking meclizine 25 mg without significant relief. Patient denies chest pain, dyspnea, palpitations, syncope, edema, melena, hematochezia, or hematemesis.\par \par PMD: Adrian Paz MD ([facility])

## 2021-08-17 NOTE — CARDIOLOGY SUMMARY
[de-identified] : \par 8/17/21 ECG: Atrial flutter with variable AV block, rate 97 bpm, RBBB+LAFB (bifascicular block)\par 3/3/21 ECG: Sinus, rate 77 bpm, PVC, PAC, RBBB, LAD\par 3/1/21 ECG (from [facility]): Reported as "Atrial fibrillation" but is really sinus rhythm with PACs, RBBB, LAFB\par  [de-identified] : \par 4/19/21 Echo: Mildly increased LV size with low normal systolic function, LVEF 50-55%. Grade II DD. Moderately increased LA volume index (42 ml/m2). Mild to mod MR. AV sclerosis. Mild to mod AR. Mild TR. Mild MN. Mildly dilated aortic root (3.9 cm).\par \par 2/19/19 Echo: Normal LV size and systolic function, LVEF 60%. Grade II DD. Mild conc LVH. Severely increased LA volume index. Mild AV sclerosis. Mod AR. Mild MR. Mild TR.

## 2021-08-17 NOTE — PHYSICAL EXAM
[Well Developed] : well developed [Well Nourished] : well nourished [No Acute Distress] : no acute distress [Normal Conjunctiva] : normal conjunctiva [Normal Venous Pressure] : normal venous pressure [No Carotid Bruit] : no carotid bruit [Normal Rate] : normal [Irregularly Irregular] : irregularly irregular [Normal S1] : normal S1 [Normal S2] : normal S2 [No Murmur] : no murmurs heard [No Pitting Edema] : no pitting edema present [2+] : left 2+ [Clear Lung Fields] : clear lung fields [Good Air Entry] : good air entry [No Respiratory Distress] : no respiratory distress  [Soft] : abdomen soft [Non Tender] : non-tender [No Masses/organomegaly] : no masses/organomegaly [Normal Bowel Sounds] : normal bowel sounds [Normal Gait] : normal gait [No Edema] : no edema [No Cyanosis] : no cyanosis [No Clubbing] : no clubbing [No Varicosities] : no varicosities [No Rash] : no rash [No Skin Lesions] : no skin lesions [Moves all extremities] : moves all extremities [No Focal Deficits] : no focal deficits [Normal Speech] : normal speech [Alert and Oriented] : alert and oriented [Normal memory] : normal memory [S3] : no S3 [S4] : no S4 [Right Carotid Bruit] : no bruit heard over the right carotid [Left Carotid Bruit] : no bruit heard over the left carotid

## 2021-08-17 NOTE — ASSESSMENT
[FreeTextEntry1] : 82 yo male with hypertension who presents today for evaluation of vertigo over the past 2 weeks.\par ECG today shows new atrial flutter with variable AV block, but rate controlled.\par \par Will continue metoprolol succinate 25 mg po daily for rate control.\par Given [id number] score = 3 (currently), patient will be started on Eliquis 5 mg po bid for thromboembolic prophylaxis.\par Given newly diagnosed atrial fibrillation, patient's recent onset of vertigo 2 weeks ago may be due to possible posterior infarct. Differential diagnosis also includes inner ear/vestibular disorder.\par \par BP is mildly elevated today. However, will continue to monitor on current regimen (amlodipine 10 mg, lisinopril 40 mg, and metoprolol succinate 25 mg) at this time pending neurologic evaluation.\par

## 2021-08-25 ENCOUNTER — APPOINTMENT (OUTPATIENT)
Dept: NEUROLOGY | Facility: CLINIC | Age: 81
End: 2021-08-25
Payer: MEDICARE

## 2021-08-25 PROCEDURE — 99203 OFFICE O/P NEW LOW 30 MIN: CPT | Mod: 95

## 2021-08-30 NOTE — ASSESSMENT
[FreeTextEntry1] : Neurologic examination is reassuring.  \par He likely has benign positional vertigo.\par \par However, because of the intensity of his symptoms and risk factors, I will obtain MRI of brain noncontrast.  I think he would benefit from vestibular therapy as well.\par \par I do not recommend any changes to his medications at this.\par \par Follow-up after therapy.

## 2021-08-30 NOTE — HISTORY OF PRESENT ILLNESS
[Home] : at home, [unfilled] , at the time of the visit. [Medical Office: ([facility])___] : at the medical office located in  [Verbal consent obtained from patient] : the patient, [unfilled] [FreeTextEntry1] : This is an 81-year-old man has been seen in neurologic consultation for evaluation of vertigo.  Patient had onset of vertigo suddenly and without warning.\par He describes the environment spinning around him.  He feels off balance.  This was very intense a few weeks ago.  However he has noticed improvement in the last week or so.  He has no hearing loss.  No tinnitus.  Now the only time he feels the intense spinning sensation and nausea is when he lays down and tilts his head head backward.  No focal or lateralizing neurologic dysfunction no problem with dysarthria or aphasia.  No falls.  Balance is stable.\par \par Of note interpretation is provided by his daughter.

## 2021-08-30 NOTE — PHYSICAL EXAM
[FreeTextEntry1] : Physical examination \par General: No acute distress, Awake, Alert. \par \par Mental status \par Awake, alert, and oriented to person, time and place, Normal attention span and concentration, Recent and remote memory intact, Language intact, Fund of knowledge intact. \par Cranial Nerves \par II: VFF \par III, IV, VI: EOMI. \par V: Facial sensation is normal B/L. \par VII: Facial strength is normal B/L. \par VIII: Gross hearing is intact. \par IX, X: Palate is midline and elevates symmetrically. \par XI: Trapezius normal strength. \par XII: Tongue midline without atrophy or fasciculations. \par \par Motor exam \par normal\par \par Gait \par Normal, mildly kyphotic.  Normal arm swing.\par

## 2021-09-10 ENCOUNTER — RESULT REVIEW (OUTPATIENT)
Age: 81
End: 2021-09-10

## 2021-09-20 ENCOUNTER — NON-APPOINTMENT (OUTPATIENT)
Age: 81
End: 2021-09-20

## 2021-09-20 ENCOUNTER — APPOINTMENT (OUTPATIENT)
Dept: CARDIOLOGY | Facility: CLINIC | Age: 81
End: 2021-09-20
Payer: MEDICARE

## 2021-09-20 VITALS
SYSTOLIC BLOOD PRESSURE: 180 MMHG | WEIGHT: 183 LBS | HEIGHT: 72 IN | HEART RATE: 56 BPM | RESPIRATION RATE: 12 BRPM | TEMPERATURE: 98.6 F | OXYGEN SATURATION: 97 % | DIASTOLIC BLOOD PRESSURE: 76 MMHG | BODY MASS INDEX: 24.79 KG/M2

## 2021-09-20 PROCEDURE — 99214 OFFICE O/P EST MOD 30 MIN: CPT

## 2021-09-20 PROCEDURE — 93000 ELECTROCARDIOGRAM COMPLETE: CPT

## 2021-09-20 NOTE — CARDIOLOGY SUMMARY
[de-identified] : \par 9/20/21 ECG: Atrial fibrillation, rate 74 bpm, RBBB & LAFB (bifascicular block)\par 8/17/21 ECG: Atrial flutter with variable AV block, rate 97 bpm, RBBB+LAFB (bifascicular block)\par 3/3/21 ECG: Sinus, rate 77 bpm, PVC, PAC, RBBB, LAD\par 3/1/21 ECG (from [facility]): Reported as "Atrial fibrillation" but is really sinus rhythm with PACs, RBBB, LAFB\par  [de-identified] : \par 4/19/21 Echo: Mildly increased LV size with low normal systolic function, LVEF 50-55%. Grade II DD. Moderately increased LA volume index (42 ml/m2). Mild to mod MR. AV sclerosis. Mild to mod AR. Mild TR. Mild NH. Mildly dilated aortic root (3.9 cm).\par \par 2/19/19 Echo: Normal LV size and systolic function, LVEF 60%. Grade II DD. Mild conc LVH. Severely increased LA volume index. Mild AV sclerosis. Mod AR. Mild MR. Mild TR.

## 2021-09-20 NOTE — PHYSICAL EXAM
[Well Developed] : well developed [Well Nourished] : well nourished [No Acute Distress] : no acute distress [Normal Conjunctiva] : normal conjunctiva [Normal Venous Pressure] : normal venous pressure [No Carotid Bruit] : no carotid bruit [Normal Rate] : normal [Irregularly Irregular] : irregularly irregular [Normal S1] : normal S1 [Normal S2] : normal S2 [No Murmur] : no murmurs heard [No Pitting Edema] : no pitting edema present [2+] : left 2+ [Clear Lung Fields] : clear lung fields [Good Air Entry] : good air entry [No Respiratory Distress] : no respiratory distress  [Normal Gait] : normal gait [No Edema] : no edema [No Cyanosis] : no cyanosis [No Clubbing] : no clubbing [No Rash] : no rash [No Skin Lesions] : no skin lesions [Moves all extremities] : moves all extremities [No Focal Deficits] : no focal deficits [Normal Speech] : normal speech [Alert and Oriented] : alert and oriented [Normal memory] : normal memory [S3] : no S3 [S4] : no S4 [Right Carotid Bruit] : no bruit heard over the right carotid [Left Carotid Bruit] : no bruit heard over the left carotid

## 2021-09-20 NOTE — HISTORY OF PRESENT ILLNESS
[FreeTextEntry1] : 82 yo male with hypertension and recently diagnosed atrial flutter on 8/17/21 ECG, who presents today for follow-up. He reports that he is currently undergoing vestibular therapy for BPPV. Brain MRI on 910/21 was negative for acute infarct. Patient denies chest pain, dyspnea, palpitations, syncope, edema, melena, hematochezia, or hematemesis. He reports that his SBP at home is 130-150s.\par \par PMD: Adrian Paz MD ([facility])

## 2021-10-12 ENCOUNTER — APPOINTMENT (OUTPATIENT)
Dept: NEUROLOGY | Facility: CLINIC | Age: 81
End: 2021-10-12
Payer: MEDICARE

## 2021-10-12 VITALS
WEIGHT: 183 LBS | TEMPERATURE: 97.1 F | HEIGHT: 72 IN | BODY MASS INDEX: 24.79 KG/M2 | HEART RATE: 72 BPM | DIASTOLIC BLOOD PRESSURE: 81 MMHG | SYSTOLIC BLOOD PRESSURE: 155 MMHG

## 2021-10-12 DIAGNOSIS — R42 DIZZINESS AND GIDDINESS: ICD-10-CM

## 2021-10-12 PROCEDURE — 99214 OFFICE O/P EST MOD 30 MIN: CPT

## 2021-10-12 NOTE — CONSULT LETTER
[Dear  ___] : Dear  [unfilled], [Consult Letter:] : I had the pleasure of evaluating your patient, [unfilled]. [Please see my note below.] : Please see my note below. [FreeTextEntry3] : Sincerely,\par \par Mac Nguyễn M.D.\par

## 2021-10-12 NOTE — ASSESSMENT
[FreeTextEntry1] : Neurologic examination is reassuring.  \par I suspect he had positional vertigo which has now resolved.\par I do not believe his symptoms are vascular.  Regardless, he is already on Eliquis for his atrial fibrillation.\par He has not needed meclizine.\par He does not need further work-up for his symptoms.\par \par He will see me as needed.\par

## 2021-10-12 NOTE — DATA REVIEWED
[de-identified] : \par  [place] MRI Report             Final\par \par No Documents Attached\par \par \par \par \par   [facility]\par                                          [address]\par                                    [place], [place]  [address]\par                                        Department of Radiology\par                                             [phone]\par \par \par Patient Name:      SHUBHAM HASTINGS                Location:       [facility]\par Med Rec #:        [id number]                    Account #:      [id number]\par YOB: 1940                    Ordering:       Joseluis Nguyễn MD\par Age: 81               Sex:    M                 Attending:      Joseluis Nguyễn MD\par PCP:        [facility]\par ______________________________________________________________________________________\par \par Exam Date:      09/10/21\par Exam:         MRI BRAIN\par Order#:       [id number]\par \par \par \par History: VERTIGO.\par \par  MRI brain without contrast  9/10/2021\par \par  Multiplanar MR imaging of brain provided. The MRI study performed on a 1.5 Ronda magnet.\par \par  There is prominence of cortical sulci, fissures and convexity subarachnoid spaces related to\par generalized volume loss. There are multiple scattered foci of T2/FLAIR hyperintensity within the\par periventricular and subcortical white matter which are nonspecific and may relate to small vessel\par ischemic disease/old infarcts. Diffusion imaging reveals no acute infarct. 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 There is no evidence of a\par cerebellopontine angle mass. There is mild bilateral mastoid air cell mucosal disease.\par \par  The distal internal carotid and vertebral basilar artery flow-voids are intact.\par \par  There is no pathologic marrow present. There is status post bilateral cataract surgery. There is\par extensive paranasal sinus mucosal disease. There is hypertrophy of the nasal turbinates.\par \par \par  Impression:\par \par  No acute infarct or hemorrhage.\par \par  Generalized volume loss.\par \par  Mild to moderate T2/FLAIR hyperintensity within white matter may relate to small vessel ischemic\par disease/old infarcts or other etiologies. Mild pontine small vessel ischemic disease.\par \par  Chronic pansinusitis. Bilateral mild mastoid air cell mucosal thickening.\par \par  --- End of Report ---\par \par ***Electronically Signed ***\par -----------------------------------------------\par Chaparro Wright MD              09/11/21 1435\par \par Dictated on 09/11/21\par \par \par Report cc:  Joseluis Nguyễn MD;\par \par  \par \par  Ordered by: JOSELUIS NGUYỄN       Collected/Examined: 10Sep2021 06:53PM       \par Verified by: JOSELUIS NGUYỄN 13Sep2021 09:33AM       \par  Result Communication: No patient communication needed at this time;\par Stage: Final       \par  Performed at: [facility]       Resulted: 11Sep2021 02:35PM       Last Updated: 13Sep2021 09:33AM       Accession: [id number]

## 2021-11-11 ENCOUNTER — LABORATORY RESULT (OUTPATIENT)
Age: 81
End: 2021-11-11

## 2021-11-11 ENCOUNTER — APPOINTMENT (OUTPATIENT)
Dept: PULMONOLOGY | Facility: CLINIC | Age: 81
End: 2021-11-11
Payer: MEDICARE

## 2021-11-11 VITALS
HEART RATE: 70 BPM | WEIGHT: 183 LBS | TEMPERATURE: 96.4 F | SYSTOLIC BLOOD PRESSURE: 142 MMHG | OXYGEN SATURATION: 98 % | HEIGHT: 72 IN | BODY MASS INDEX: 24.79 KG/M2 | DIASTOLIC BLOOD PRESSURE: 70 MMHG

## 2021-11-11 PROCEDURE — 36415 COLL VENOUS BLD VENIPUNCTURE: CPT

## 2021-11-11 PROCEDURE — 99215 OFFICE O/P EST HI 40 MIN: CPT | Mod: 25

## 2021-11-12 LAB
BASOPHILS # BLD AUTO: 0.04 K/UL
BASOPHILS NFR BLD AUTO: 0.5 %
EOSINOPHIL # BLD AUTO: 0.36 K/UL
EOSINOPHIL NFR BLD AUTO: 4.8 %
HCT VFR BLD CALC: 49.7 %
HGB BLD-MCNC: 16.3 G/DL
IMM GRANULOCYTES NFR BLD AUTO: 0.3 %
LYMPHOCYTES # BLD AUTO: 1.75 K/UL
LYMPHOCYTES NFR BLD AUTO: 23.4 %
MAGNESIUM SERPL-MCNC: 2.2 MG/DL
MAN DIFF?: NORMAL
MCHC RBC-ENTMCNC: 31.5 PG
MCHC RBC-ENTMCNC: 32.8 GM/DL
MCV RBC AUTO: 95.9 FL
MONOCYTES # BLD AUTO: 0.56 K/UL
MONOCYTES NFR BLD AUTO: 7.5 %
NEUTROPHILS # BLD AUTO: 4.74 K/UL
NEUTROPHILS NFR BLD AUTO: 63.5 %
PLATELET # BLD AUTO: 165 K/UL
RBC # BLD: 5.18 M/UL
RBC # FLD: 14.3 %
WBC # FLD AUTO: 7.47 K/UL

## 2021-11-15 LAB
A ALTERNATA IGE QN: <0.1 KUA/L
A FUMIGATUS IGE QN: <0.1 KUA/L
BUDGERIGAR FEATHER (E78) CLASS: 0
BUDGERIGAR FEATHER (E78) CLASS: 0
BUDGERIGAR FEATHER (E78) CONC: <0.1 KUA/L
BUDGERIGAR FEATHER (E78) CONC: <0.1 KUA/L
C HERBARUM IGE QN: <0.1 KUA/L
CANARY BIRD FEATHERS (E201) CLASS: 0
CANARY BIRD FEATHERS (E201) CONC: <0.1 KUA/L
CAT (RFEL D) 1 IGE QN: <0.1 KUA/L
CAT (RFEL D) 4 IGE QN: <0.1 KUA/L
CAT (RFEL D) 7 IGE QN: <0.1 KUA/L
CAT SERUM ALB IGE QN: <0.1 KUA/L
CHICKEN FEATHER IGE QN: <0.1 KUA/L
DEPRECATED A ALTERNATA IGE RAST QL: 0
DEPRECATED A FUMIGATUS IGE RAST QL: 0
DEPRECATED C HERBARUM IGE RAST QL: 0
DEPRECATED CAT (RFEL D) 1 IGE RAST QL: 0
DEPRECATED CAT (RFEL D) 4 IGE RAST QL: 0
DEPRECATED CAT (RFEL D) 7 IGE RAST QL: 0
DEPRECATED CAT SERUM ALB IGE RAST QL: 0
DEPRECATED CHICKEN FEATHER IGE RAST QL: 0
DEPRECATED DOG (RCAN F) 1 IGE RAST QL: 0
DEPRECATED DOG (RCAN F) 2 IGE RAST QL: 0
DEPRECATED DOG (RCAN F) 4 IGE RAST QL: 0
DEPRECATED DOG (RCAN F) 5 IGE RAST QL: 0
DEPRECATED DOG (RCAN F) 6 IGE RAST QL: 0
DEPRECATED DOG SERUM ALB IGE RAST QL: 0
DEPRECATED DUCK FEATHER IGE RAST QL: 0
DEPRECATED GOOSE FEATHER IGE RAST QL: 0
DEPRECATED MOUSE EPITH IGE RAST QL: 0
DEPRECATED P NOTATUM IGE RAST QL: 0
DEPRECATED S ROSTRATA IGE RAST QL: 0
DOG (RCAN F) 1 IGE QN: <0.1 KUA/L
DOG (RCAN F) 2 IGE QN: <0.1 KUA/L
DOG (RCAN F) 4 IGE QN: <0.1 KUA/L
DOG (RCAN F) 5 IGE QN: <0.1 KUA/L
DOG (RCAN F) 6 IGE QN: <0.1 KUA/L
DOG SERUM ALB IGE QN: <0.1 KUA/L
DUCK FEATHER IGE QN: <0.1 KUA/L
GOOSE FEATHER IGE QN: <0.1 KUA/L
HORSE (REQU C) 1 IGE QN: 0
HORSE REQU C 1 IGE E227 CONC: <0.1 KUA/L
MOUSE EPITH IGE QN: <0.1 KUA/L
P NOTATUM IGE QN: <0.1 KUA/L
S ROSTRATA IGE QN: <0.1 KUA/L

## 2021-11-15 NOTE — PHYSICAL EXAM
[No Acute Distress] : no acute distress [Low Lying Soft Palate] : low lying soft palate [Normal Appearance] : normal appearance [No Neck Mass] : no neck mass [Normal Rate/Rhythm] : normal rate/rhythm [Normal S1, S2] : normal s1, s2 [No Murmurs] : no murmurs [No Abnormalities] : no abnormalities [Benign] : benign [Normal Gait] : normal gait [No Clubbing] : no clubbing [No Cyanosis] : no cyanosis [No Edema] : no edema [FROM] : FROM [Normal Color/ Pigmentation] : normal color/ pigmentation [No Focal Deficits] : no focal deficits [Oriented x3] : oriented x3 [Normal Affect] : normal affect [Nasal congestion] : nasal congestion [No Rubs] : no rubs [No Gallops] : no gallops [No Resp Distress] : no resp distress [TextBox_11] : inflamed nasal mucosa, erythematous o/p [TextBox_54] : distant [TextBox_68] : diffuse expiratory wheeze

## 2021-11-15 NOTE — HISTORY OF PRESENT ILLNESS
[Former] : former [>= 30 pack years] : >= 30 pack years [Never] : never [TextBox_4] : Patient returns on a f/u for COPD. He has been having recent trouble breathing in the cold weather. He is used to being able to walk 6 mi w/o any CRESPO, but lately w/ the cold, when he walks that same distance he feels tightness and only able to breathe retirement. His albuterol rescue inhaler makes him feel better, but also raises his pulse and makes him feel shaky. He has been using his rescue inhaler 3-4x/day and Breo QAM. He lives w/ 3 birds, a cat, and a dog. He believes he has seasonal allergies to pollen but his last [place] allergy panel was neg. He does not use any nasal saline. [TextBox_11] : 2.5 [TextBox_13] : 15 [YearQuit] : 1990

## 2021-11-29 ENCOUNTER — APPOINTMENT (OUTPATIENT)
Dept: CARDIOLOGY | Facility: CLINIC | Age: 81
End: 2021-11-29
Payer: MEDICARE

## 2021-11-29 VITALS
RESPIRATION RATE: 12 BRPM | BODY MASS INDEX: 25.06 KG/M2 | HEART RATE: 87 BPM | HEIGHT: 72 IN | OXYGEN SATURATION: 99 % | TEMPERATURE: 98.6 F | DIASTOLIC BLOOD PRESSURE: 90 MMHG | WEIGHT: 185 LBS | SYSTOLIC BLOOD PRESSURE: 146 MMHG

## 2021-11-29 PROCEDURE — 99214 OFFICE O/P EST MOD 30 MIN: CPT

## 2021-11-29 PROCEDURE — 36415 COLL VENOUS BLD VENIPUNCTURE: CPT

## 2021-11-29 NOTE — ASSESSMENT
[FreeTextEntry1] : 82 yo male with hypertension, recently diagnosed atrial flutter with variable AV block on 8/17/21. \par \par Will continue metoprolol succinate 25 mg po daily for rate control.\par Given [id number] score = 3 (currently), will continue Eliquis 5 mg po bid for thromboembolic prophylaxis.\par \par BP is elevated today on current regimen of amlodipine 10 mg, lisinopril 40 mg, metoprolol succinate 25 mg, and HCTZ 12.5 mg po daily.\par Will check BMP today. If lytes and renal function are normal, will increase HCTZ to 25 mg po daily with follow-up and labs in 1 month.

## 2021-11-29 NOTE — CARDIOLOGY SUMMARY
[de-identified] : \par 9/20/21 ECG: Atrial fibrillation, rate 74 bpm, RBBB & LAFB (bifascicular block)\par 8/17/21 ECG: Atrial flutter with variable AV block, rate 97 bpm, RBBB+LAFB (bifascicular block)\par 3/3/21 ECG: Sinus, rate 77 bpm, PVC, PAC, RBBB, LAD\par 3/1/21 ECG (from [facility]): Reported as "Atrial fibrillation" but is really sinus rhythm with PACs, RBBB, LAFB\par  [de-identified] : \par 4/19/21 Echo: Mildly increased LV size with low normal systolic function, LVEF 50-55%. Grade II DD. Moderately increased LA volume index (42 ml/m2). Mild to mod MR. AV sclerosis. Mild to mod AR. Mild TR. Mild VA. Mildly dilated aortic root (3.9 cm).\par \par 2/19/19 Echo: Normal LV size and systolic function, LVEF 60%. Grade II DD. Mild conc LVH. Severely increased LA volume index. Mild AV sclerosis. Mod AR. Mild MR. Mild TR.

## 2021-11-29 NOTE — PHYSICAL EXAM
[Well Developed] : well developed [Well Nourished] : well nourished [No Acute Distress] : no acute distress [Normal Conjunctiva] : normal conjunctiva [Normal Venous Pressure] : normal venous pressure [No Carotid Bruit] : no carotid bruit [Normal Rate] : normal [Irregularly Irregular] : irregularly irregular [Normal S1] : normal S1 [Normal S2] : normal S2 [No Murmur] : no murmurs heard [No Pitting Edema] : no pitting edema present [2+] : left 2+ [Clear Lung Fields] : clear lung fields [Good Air Entry] : good air entry [No Respiratory Distress] : no respiratory distress  [Normal Gait] : normal gait [No Edema] : no edema [No Cyanosis] : no cyanosis [No Clubbing] : no clubbing [Moves all extremities] : moves all extremities [No Focal Deficits] : no focal deficits [Normal Speech] : normal speech [Alert and Oriented] : alert and oriented [Normal memory] : normal memory [S3] : no S3 [S4] : no S4 [Right Carotid Bruit] : no bruit heard over the right carotid [Left Carotid Bruit] : no bruit heard over the left carotid

## 2021-11-29 NOTE — HISTORY OF PRESENT ILLNESS
[FreeTextEntry1] : 80 yo male with hypertension and atrial flutter/fibrillation, who presents today for follow-up. Patient denies chest pain, dyspnea, palpitations, syncope, edema, melena, hematochezia, or hematemesis. \par \par PCP: Adrian Paz ([place] [facility])\par Pulm: Froylan Cruz

## 2021-11-30 LAB
ALBUMIN SERPL ELPH-MCNC: 4.4 G/DL
ALP BLD-CCNC: 102 U/L
ALT SERPL-CCNC: 72 U/L
ANION GAP SERPL CALC-SCNC: 13 MMOL/L
AST SERPL-CCNC: 53 U/L
BILIRUB SERPL-MCNC: 0.9 MG/DL
BUN SERPL-MCNC: 21 MG/DL
CALCIUM SERPL-MCNC: 9.7 MG/DL
CHLORIDE SERPL-SCNC: 106 MMOL/L
CO2 SERPL-SCNC: 22 MMOL/L
CREAT SERPL-MCNC: 0.87 MG/DL
GLUCOSE SERPL-MCNC: 104 MG/DL
POTASSIUM SERPL-SCNC: 4.5 MMOL/L
PROT SERPL-MCNC: 6.9 G/DL
SODIUM SERPL-SCNC: 141 MMOL/L

## 2021-12-17 ENCOUNTER — APPOINTMENT (OUTPATIENT)
Dept: PULMONOLOGY | Facility: CLINIC | Age: 81
End: 2021-12-17
Payer: MEDICARE

## 2021-12-17 VITALS
HEART RATE: 66 BPM | SYSTOLIC BLOOD PRESSURE: 132 MMHG | WEIGHT: 181 LBS | HEIGHT: 72 IN | DIASTOLIC BLOOD PRESSURE: 76 MMHG | OXYGEN SATURATION: 98 % | TEMPERATURE: 98.6 F | BODY MASS INDEX: 24.52 KG/M2

## 2021-12-17 DIAGNOSIS — J30.89 OTHER ALLERGIC RHINITIS: ICD-10-CM

## 2021-12-17 DIAGNOSIS — G47.33 OBSTRUCTIVE SLEEP APNEA (ADULT) (PEDIATRIC): ICD-10-CM

## 2021-12-17 DIAGNOSIS — R09.81 NASAL CONGESTION: ICD-10-CM

## 2021-12-17 DIAGNOSIS — R06.83 SNORING: ICD-10-CM

## 2021-12-17 PROCEDURE — 99214 OFFICE O/P EST MOD 30 MIN: CPT

## 2021-12-17 NOTE — CURRENT MEDS
[Takes medication as prescribed] : takes Detail Level: Zone Render Risk Assessment In Note?: no Additional Notes: Pt has obtained baseline bloodwork and imported into chart. Pt to recheck labs at follow up after starting on Accutane. We are currently only waiting for a note from patient’s therapist stating it is ok to proceed with starting accutane before sending patient’s first prescription to [facility]

## 2021-12-20 PROBLEM — R09.81 NASAL CONGESTION: Status: ACTIVE | Noted: 2021-11-11

## 2021-12-20 PROBLEM — J30.89 ENVIRONMENTAL AND SEASONAL ALLERGIES: Status: ACTIVE | Noted: 2019-07-05

## 2021-12-20 PROBLEM — R06.83 SNORING: Status: ACTIVE | Noted: 2019-09-09

## 2021-12-20 PROBLEM — G47.33 OBSTRUCTIVE SLEEP APNEA: Status: ACTIVE | Noted: 2021-12-17

## 2021-12-20 NOTE — PROCEDURE
[FreeTextEntry1] : HST 11/28/2021: AHI=19, lowest O2 sat 87%\par \par CMP 11/29/2021: Glu 104, Ast/Alt 53/72\par \par CBC 11/11/2021: Wbc 7.47, Hgb 16.3, Hct 49.7, Plt 165, eos 4.8%#0.04 \par  Mg 2.2

## 2021-12-20 NOTE — PHYSICAL EXAM
[No Acute Distress] : no acute distress [Low Lying Soft Palate] : low lying soft palate [Nasal congestion] : nasal congestion [Normal Appearance] : normal appearance [No Neck Mass] : no neck mass [Normal Rate/Rhythm] : normal rate/rhythm [Normal S1, S2] : normal s1, s2 [No Murmurs] : no murmurs [No Rubs] : no rubs [No Gallops] : no gallops [No Resp Distress] : no resp distress [No Abnormalities] : no abnormalities [Benign] : benign [Normal Gait] : normal gait [No Clubbing] : no clubbing [No Cyanosis] : no cyanosis [No Edema] : no edema [FROM] : FROM [Normal Color/ Pigmentation] : normal color/ pigmentation [No Focal Deficits] : no focal deficits [Oriented x3] : oriented x3 [Normal Affect] : normal affect [Erythema] : erythema [TextBox_11] : crowded o/p, dry MM [TextBox_68] : + rhonchi, mild expiratory wheeze [TextBox_89] : obese

## 2021-12-20 NOTE — HISTORY OF PRESENT ILLNESS
[Former] : former [>= 30 pack years] : >= 30 pack years [Never] : never [TextBox_4] : Patient returns on a f/u for COPD. He c/o frequent palpitations and coughing at night of moderate severity. He always has to sleep w/ the A/C on at night. He has been using Trelegy 100 w/ the rescue inhaler and can still walk 6 mi w/o CRESPO. He has not smoked at all since quitting completely in 1990. [TextBox_11] : 2.5 [TextBox_13] : 15 [YearQuit] : 1990

## 2022-03-09 ENCOUNTER — RX RENEWAL (OUTPATIENT)
Age: 82
End: 2022-03-09

## 2022-04-14 ENCOUNTER — NON-APPOINTMENT (OUTPATIENT)
Age: 82
End: 2022-04-14

## 2022-04-22 ENCOUNTER — RESULT REVIEW (OUTPATIENT)
Age: 82
End: 2022-04-22

## 2022-04-25 ENCOUNTER — NON-APPOINTMENT (OUTPATIENT)
Age: 82
End: 2022-04-25

## 2022-04-25 ENCOUNTER — APPOINTMENT (OUTPATIENT)
Dept: CARDIOLOGY | Facility: CLINIC | Age: 82
End: 2022-04-25
Payer: MEDICARE

## 2022-04-25 VITALS
WEIGHT: 171 LBS | HEART RATE: 66 BPM | SYSTOLIC BLOOD PRESSURE: 146 MMHG | DIASTOLIC BLOOD PRESSURE: 68 MMHG | BODY MASS INDEX: 23.19 KG/M2 | OXYGEN SATURATION: 99 % | RESPIRATION RATE: 12 BRPM

## 2022-04-25 DIAGNOSIS — I26.99 OTHER PULMONARY EMBOLISM W/OUT ACUTE COR PULMONALE: ICD-10-CM

## 2022-04-25 PROCEDURE — 93000 ELECTROCARDIOGRAM COMPLETE: CPT

## 2022-04-25 PROCEDURE — 99496 TRANSJ CARE MGMT HIGH F2F 7D: CPT

## 2022-04-25 RX ORDER — ATORVASTATIN CALCIUM 80 MG/1
80 TABLET, FILM COATED ORAL DAILY
Refills: 0 | Status: ACTIVE | COMMUNITY
Start: 2022-04-11

## 2022-04-25 NOTE — HISTORY OF PRESENT ILLNESS
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 He was recently hospitalized at [place] from 4/22-4/24/22 when he presented with SOB/orthopnea. CTPA on 4/22/22 reported possible pulmonary emboli in RUL. Echocardiogram on 4/22/22 reported moderately dilated LV with moderate global hypokinesis & LVEF 33%, which is new compared to 4/2021 echo. He was restarted on Eliquis and started on Entresto for new LV systolic dysfunction. Patient denies chest pain, dyspnea, palpitations, syncope, edema, melena, hematochezia, or hematemesis. \par \par PCP: Adrian Paz ([facility])\par Pulm: Froylan Cruz

## 2022-04-25 NOTE — CARDIOLOGY SUMMARY
[de-identified] : \par 4/25/22 ECG: Atrial fibrillation, rate 78 bpm, RBBB & LAFB (bifascicular block)\par 9/20/21 ECG: Atrial fibrillation, rate 74 bpm, RBBB & LAFB (bifascicular block)\par 8/17/21 ECG: Atrial flutter with variable AV block, rate 97 bpm, RBBB+LAFB (bifascicular block)\par 3/3/21 ECG: Sinus, rate 77 bpm, PVC, PAC, RBBB, LAD\par 3/1/21 ECG (from [facility]): Reported as "Atrial fibrillation" but is really sinus rhythm with PACs, RBBB, LAFB\par  [de-identified] : \par 4/22/22 Echo (at [facility]): Moderately dilated LV with moderate global hypokinesis, LVEF 33%. Severe biatrial enlargement. Mild AR. Mild to mod MR. Mod TR. Mild pulm HTN. Dilated aortic root (4.2 cm).\par \par 4/19/21 Echo: Mildly increased LV size with low normal systolic function, LVEF 50-55%. Grade II DD. Moderately increased LA volume index (42 ml/m2). Mild to mod MR. AV sclerosis. Mild to mod AR. Mild TR. Mild SC. Mildly dilated aortic root (3.9 cm).\par \par 2/19/19 Echo: Normal LV size and systolic function, LVEF 60%. Grade II DD. Mild conc LVH. Severely increased LA volume index. Mild AV sclerosis. Mod AR. Mild MR. Mild TR.

## 2022-04-25 NOTE — ASSESSMENT
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 \par \par Given newly diagnosed LV systolic dysfunction per 4/22/22 echo, will perform treadmill nuclear stress test for ischemic evaluation.\par Pending review of test results, will determine if further cardiac work-up or intervention is clinically indicated.\par Patient to start Entresto 24 mg/26 mg po bid today.\par Will continue metoprolol succinate 25 mg po daily.\par \par ECG today demonstrates atrial fibrillation, rate 78 bpm, and bifascicular block (RBBB+LAFB).\par Will continue metoprolol succinate 25 mg po daily for rate control.\par Given [id number] score = 6, will continue Eliquis 5 mg po bid for afib thromboembolic prophylaxis.\par \par Will continue Eliquis for recently diagnosed possible RUL pulmonary emboli. However, patient will continue long-term anticoagulation given recent CVA and atrial fibrillation.\par \par BP is elevated today on amlodipine 10 mg, metoprolol succinate 25 mg, and HCTZ 25 mg po daily.\par Patient to start Entresto today, which will help control BP.

## 2022-04-26 ENCOUNTER — TRANSCRIPTION ENCOUNTER (OUTPATIENT)
Age: 82
End: 2022-04-26

## 2022-05-24 ENCOUNTER — APPOINTMENT (OUTPATIENT)
Dept: CARDIOLOGY | Facility: CLINIC | Age: 82
End: 2022-05-24

## 2022-05-27 ENCOUNTER — RX RENEWAL (OUTPATIENT)
Age: 82
End: 2022-05-27

## 2022-06-10 ENCOUNTER — RESULT REVIEW (OUTPATIENT)
Age: 82
End: 2022-06-10

## 2022-06-15 ENCOUNTER — APPOINTMENT (OUTPATIENT)
Dept: CARDIOLOGY | Facility: CLINIC | Age: 82
End: 2022-06-15
Payer: MEDICARE

## 2022-06-15 VITALS
HEART RATE: 77 BPM | DIASTOLIC BLOOD PRESSURE: 80 MMHG | RESPIRATION RATE: 12 BRPM | WEIGHT: 171 LBS | HEIGHT: 72 IN | SYSTOLIC BLOOD PRESSURE: 159 MMHG | OXYGEN SATURATION: 98 % | BODY MASS INDEX: 23.16 KG/M2

## 2022-06-15 DIAGNOSIS — R94.39 ABNORMAL RESULT OF OTHER CARDIOVASCULAR FUNCTION STUDY: ICD-10-CM

## 2022-06-15 PROCEDURE — 36415 COLL VENOUS BLD VENIPUNCTURE: CPT

## 2022-06-15 PROCEDURE — 99214 OFFICE O/P EST MOD 30 MIN: CPT

## 2022-06-15 NOTE — PHYSICAL EXAM
[Well Developed] : well developed [Well Nourished] : well nourished [No Acute Distress] : no acute distress [Normal Conjunctiva] : normal conjunctiva [Normal Venous Pressure] : normal venous pressure [No Carotid Bruit] : no carotid bruit [Normal Rate] : normal [Irregularly Irregular] : irregularly irregular [Normal S1] : normal S1 [Normal S2] : normal S2 [No Murmur] : no murmurs heard [No Pitting Edema] : no pitting edema present [2+] : left 2+ [Clear Lung Fields] : clear lung fields [Good Air Entry] : good air entry [No Respiratory Distress] : no respiratory distress  [No Edema] : no edema [No Cyanosis] : no cyanosis [No Clubbing] : no clubbing [Moves all extremities] : moves all extremities [No Focal Deficits] : no focal deficits [Normal Speech] : normal speech [Alert and Oriented] : alert and oriented [Normal memory] : normal memory [S3] : no S3 [S4] : no S4 [Right Carotid Bruit] : no bruit heard over the right carotid [Left Carotid Bruit] : no bruit heard over the left carotid

## 2022-06-15 NOTE — CARDIOLOGY SUMMARY
[de-identified] : \par 4/25/22 ECG: Atrial fibrillation, rate 78 bpm, RBBB & LAFB (bifascicular block)\par 9/20/21 ECG: Atrial fibrillation, rate 74 bpm, RBBB & LAFB (bifascicular block)\par 8/17/21 ECG: Atrial flutter with variable AV block, rate 97 bpm, RBBB+LAFB (bifascicular block)\par 3/3/21 ECG: Sinus, rate 77 bpm, PVC, PAC, RBBB, LAD\par 3/1/21 ECG (from [facility]): Reported as "Atrial fibrillation" but is really sinus rhythm with PACs, RBBB, LAFB\par  [de-identified] : \par 6/10/22 Lexiscan Myoview (at [facility]): No CP or ECG changes. Small apical infarct. Dilated LV with moderate global hypokinesis, LVEF 34%.\par  [de-identified] : \par 4/22/22 Echo (at [facility]): Moderately dilated LV with moderate global hypokinesis, LVEF 33%. Severe biatrial enlargement. Mild AR. Mild to mod MR. Mod TR. Mild pulm HTN. Dilated aortic root (4.2 cm).\par \par 4/19/21 Echo: Mildly increased LV size with low normal systolic function, LVEF 50-55%. Grade II DD. Moderately increased LA volume index (42 ml/m2). Mild to mod MR. AV sclerosis. Mild to mod AR. Mild TR. Mild AL. Mildly dilated aortic root (3.9 cm).\par \par 2/19/19 Echo: Normal LV size and systolic function, LVEF 60%. Grade II DD. Mild conc LVH. Severely increased LA volume index. Mild AV sclerosis. Mod AR. Mild MR. Mild TR.

## 2022-06-15 NOTE — ASSESSMENT
[FreeTextEntry1] : 80 yo male with hypertension, atrial flutter/fibrillation (on Eliquis), CVA in early April 2022 s/p thrombectomy at [facility], and possible RUL pulmonary emboli per 4/22/22 CTPA, and recently diagnosed acute systolic heart failure per 4/22/22 echocardiogram, which reported reported moderately dilated LV with moderate global hypokinesis & LVEF 33%, which was new compared to 4/2021 echo. He underwent Lexiscan nuclear stress test on 6/10/22, which reported a small apical infarct and dilated LV with moderate global hypokinesis, LVEF 34%.\par \par Given newly diagnosed acute systolic heart failure and reported apical infarct per 6/10/22 nuclear stress test, will refer patient for cardiac catheterization at [facility] to evaluate for obstructive CAD and possible PCI.\par Pre-cath labs were drawn today.\par Will continue Entresto 24 mg/26 mg po bid and metoprolol succinate 25 mg po daily at this time pending cardiac cath results. \par \par Will continue metoprolol succinate 25 mg po daily for afib rate control.\par Given [id number] score = 6, will continue Eliquis 5 mg po bid for afib thromboembolic prophylaxis.\par \par Will continue Eliquis for recently diagnosed possible RUL pulmonary emboli. However, patient will continue long-term anticoagulation given recent CVA and atrial fibrillation.\par \par Will continue amlodipine 10 mg, metoprolol succinate 25 mg, HCTZ 25 mg po daily, and Entresto for BP control.

## 2022-06-15 NOTE — HISTORY OF PRESENT ILLNESS
[FreeTextEntry1] : 82 yo male with hypertension, atrial flutter/fibrillation (on Eliquis), CVA in early April 2022 s/p thrombectomy at [facility], and possible RUL pulmonary emboli per 4/22/22 CTPA, and recently diagnosed acute systolic heart failure per 4/22/22 echocardiogram, which reported reported moderately dilated LV with moderate global hypokinesis & LVEF 33%, which was new compared to 4/2021 echo. He underwent Lexiscan nuclear stress test on 6/10/22, which reported a small apical infarct and dilated LV with moderate global hypokinesis, LVEF 34%. He reports intermittent dyspnea and reports vague left sided/axillary chest pain in the past.\par \par PCP: Adrian Paz ([facility])\par Pulm: Froylan Cruz

## 2022-06-16 LAB
ALBUMIN SERPL ELPH-MCNC: 4.4 G/DL
ALP BLD-CCNC: 108 U/L
ALT SERPL-CCNC: 23 U/L
ANION GAP SERPL CALC-SCNC: 12 MMOL/L
AST SERPL-CCNC: 28 U/L
BASOPHILS # BLD AUTO: 0.04 K/UL
BASOPHILS NFR BLD AUTO: 0.6 %
BILIRUB SERPL-MCNC: 1.1 MG/DL
BUN SERPL-MCNC: 17 MG/DL
CALCIUM SERPL-MCNC: 9.7 MG/DL
CHLORIDE SERPL-SCNC: 106 MMOL/L
CHOLEST SERPL-MCNC: 120 MG/DL
CO2 SERPL-SCNC: 23 MMOL/L
CREAT SERPL-MCNC: 0.85 MG/DL
EGFR: 86 ML/MIN/1.73M2
EOSINOPHIL # BLD AUTO: 0.27 K/UL
EOSINOPHIL NFR BLD AUTO: 4 %
GLUCOSE SERPL-MCNC: 109 MG/DL
HCT VFR BLD CALC: 44.8 %
HDLC SERPL-MCNC: 52 MG/DL
HGB BLD-MCNC: 14.9 G/DL
IMM GRANULOCYTES NFR BLD AUTO: 0.3 %
LDLC SERPL CALC-MCNC: 58 MG/DL
LYMPHOCYTES # BLD AUTO: 1.61 K/UL
LYMPHOCYTES NFR BLD AUTO: 23.8 %
MAN DIFF?: NORMAL
MCHC RBC-ENTMCNC: 31.4 PG
MCHC RBC-ENTMCNC: 33.3 GM/DL
MCV RBC AUTO: 94.3 FL
MONOCYTES # BLD AUTO: 0.56 K/UL
MONOCYTES NFR BLD AUTO: 8.3 %
NEUTROPHILS # BLD AUTO: 4.26 K/UL
NEUTROPHILS NFR BLD AUTO: 63 %
NONHDLC SERPL-MCNC: 68 MG/DL
PLATELET # BLD AUTO: 115 K/UL
POTASSIUM SERPL-SCNC: 4 MMOL/L
PROT SERPL-MCNC: 6.4 G/DL
RBC # BLD: 4.75 M/UL
RBC # FLD: 14.5 %
SODIUM SERPL-SCNC: 141 MMOL/L
TRIGL SERPL-MCNC: 53 MG/DL
WBC # FLD AUTO: 6.76 K/UL

## 2022-09-23 ENCOUNTER — APPOINTMENT (OUTPATIENT)
Dept: GASTROENTEROLOGY | Facility: CLINIC | Age: 82
End: 2022-09-23

## 2022-09-23 VITALS
HEART RATE: 74 BPM | HEIGHT: 72 IN | DIASTOLIC BLOOD PRESSURE: 76 MMHG | OXYGEN SATURATION: 98 % | TEMPERATURE: 96.3 F | SYSTOLIC BLOOD PRESSURE: 142 MMHG | WEIGHT: 171 LBS | BODY MASS INDEX: 23.16 KG/M2

## 2022-09-23 PROCEDURE — 99204 OFFICE O/P NEW MOD 45 MIN: CPT

## 2022-09-23 NOTE — HISTORY OF PRESENT ILLNESS
[de-identified] : SHUBHAM HASTINGS  is being evaluated at the request of Dr. Corinne Vidulich for an opinion re: colon cancer screening / intermittent  BRBPR\par \par Denies  nausea, vomiting, fever, chills, diarrhea, constipation, melena, hematemesis, BRBPR\par \par Colonoscopy 11/2016 secondary to history of colon cancer  ( s/p left hemicolectomy in 2009) revealed hemorrhoids, diverticulosis and a benign polyp [FreeTextEntry1] : Colonposcopy

## 2022-09-23 NOTE — CONSULT LETTER
[Dear  ___] : Dear  [unfilled], [Consult Letter:] : I had the pleasure of evaluating your patient, [unfilled]. [Please see my note below.] : Please see my note below. [Consult Closing:] : Thank you very much for allowing me to participate in the care of this patient.  If you have any questions, please do not hesitate to contact me. [Sincerely,] : Sincerely, [FreeTextEntry3] : Andrzej Mckee MD\par tel: [phone]\par fax: [phone]\par

## 2022-09-23 NOTE — HISTORY OF PRESENT ILLNESS
[de-identified] : SHUBHAM HASTINGS  is being evaluated at the request of Dr. Corinne Vidulich for an opinion re: colon cancer screening / intermittent  BRBPR\par \par Denies  nausea, vomiting, fever, chills, diarrhea, constipation, melena, hematemesis, BRBPR\par \par Colonoscopy 11/2016 secondary to history of colon cancer  ( s/p left hemicolectomy in 2009) revealed hemorrhoids, diverticulosis and a benign polyp [FreeTextEntry1] : Colonposcopy

## 2022-09-23 NOTE — ASSESSMENT
[FreeTextEntry1] : History of  colon cance /  intermittent  BRBPR  Colonoscopy scheduled\par \par Pertinent available records reviewed\par Risks of the procedure including but not limited to bleeding / perforation / infection / anesthesia complication / missed polyp or lesion explained to the  patient . The patient expressed understanding and a desire to proceed with the procedure.\par \par Risk of not doing procedure includes but is not limited to missed or delayed diagnosis of gastrointestinal pathology.\par The patient is at increased risk of anesthesia / procedure related complications  secondary to age\par

## 2022-09-23 NOTE — PHYSICAL EXAM
[General Appearance - Alert] : alert [General Appearance - In No Acute Distress] : in no acute distress [Sclera] : the sclera and conjunctiva were normal [Neck Appearance] : the appearance of the neck was normal [] : no respiratory distress [Abdomen Soft] : soft [No CVA Tenderness] : no ~M costovertebral angle tenderness [Abnormal Walk] : normal gait [Skin Color & Pigmentation] : normal skin color and pigmentation [No Focal Deficits] : no focal deficits [Oriented To Time, Place, And Person] : oriented to person, place, and time

## 2022-09-23 NOTE — CONSULT LETTER
[Dear  ___] : Dear  [unfilled], [Consult Letter:] : I had the pleasure of evaluating your patient, [unfilled]. [Please see my note below.] : Please see my note below. [Consult Closing:] : Thank you very much for allowing me to participate in the care of this patient.  If you have any questions, please do not hesitate to contact me. [Sincerely,] : Sincerely, [FreeTextEntry3] : Andrzej Mckee MD\par tel: [phone]\par fax: [phone]\par

## 2022-10-04 ENCOUNTER — NON-APPOINTMENT (OUTPATIENT)
Age: 82
End: 2022-10-04

## 2022-10-05 ENCOUNTER — NON-APPOINTMENT (OUTPATIENT)
Age: 82
End: 2022-10-05

## 2022-10-06 ENCOUNTER — APPOINTMENT (OUTPATIENT)
Dept: PODIATRY | Facility: CLINIC | Age: 82
End: 2022-10-06
Payer: MEDICARE

## 2022-10-06 VITALS
WEIGHT: 170 LBS | OXYGEN SATURATION: 97 % | HEIGHT: 72 IN | SYSTOLIC BLOOD PRESSURE: 140 MMHG | BODY MASS INDEX: 23.03 KG/M2 | DIASTOLIC BLOOD PRESSURE: 80 MMHG | HEART RATE: 78 BPM

## 2022-10-06 PROCEDURE — 99203 OFFICE O/P NEW LOW 30 MIN: CPT

## 2022-10-06 NOTE — HISTORY OF PRESENT ILLNESS
[FreeTextEntry1] : Location: 3rd toe right foot\par Duration: 2 months, rapid onset, no hx of injury\par Etiology: unknown\par Past Tx: OTC medication (corn acid), warm water with epsom salts\par Exacerbated by: walking, presuure\par \par

## 2022-10-06 NOTE — PROCEDURE
[FreeTextEntry1] : had a lengthy discussion with the patient regarding the diagnosis etiology and differential diagnosis as well as treatment options for the presenting problem. Risks alternatives and benefits of treatment ranging from conservative to surgical explained in great detail. I also explained the progression of treatment from conservative to possible surgical treatment options as well as the benefits of each. I do stress conservative treatment if in fact conservative treatment is an option until it no longer provides relief. Over-the-counter products medications padding, and splinting were reviewed as well. All questions asked and answered appropriately to the patient's satisfaction\par \par I have reviewed the care orders with the patient they understand and they also understands the long-term consequences of not following my wound care orders as well  precautions I have recommended  and shoe gear advice I have mentioned.\par \par Gave:\par band-aids\par bactroban \par crest pads\par Sx shoe\par Maintain pressure relief\par dressings qd\par \par follow up appt 1 week\par

## 2022-10-06 NOTE — PHYSICAL EXAM
[General Appearance - Alert] : alert [General Appearance - In No Acute Distress] : in no acute distress [Ankle Swelling Bilaterally] : bilaterally  [Varicose Veins Of Lower Extremities] : bilaterally [] : on both lower extremities [Ankle Swelling On The Left] : moderate [Skin Turgor] : normal skin turgor [Foot Ulcer] : foot ulcer [Sensation] : the sensory exam was normal to light touch and pinprick [No Focal Deficits] : no focal deficits [Deep Tendon Reflexes (DTR)] : deep tendon reflexes were 2+ and symmetric [Motor Exam] : the motor exam was normal [Oriented To Time, Place, And Person] : oriented to person, place, and time [Impaired Insight] : insight and judgment were intact [Affect] : the affect was normal [Ankle Swelling (On Exam)] : not present [FreeTextEntry3] : The vascular exam reveals decreased pedal pulses bilateral, a capillary fill time of 3-5 seconds,  mild atrophic skin changes, mild varicosities absence of hair growth, but no cyanosis, clubbing or mottling seen. [de-identified] : overall muscle strength testing is decreased, but consistant with the patients age and medical problems. Mild atrophy and overall weakness present.\par Evaluation of the painful hammertoe in question shows classic hammertoe deformity at the PIP joint where there is a dorsal contracture noted as well as a prominent extensor tendon there was then elevation and at the MP joint with plantar flexion at the PIP joint and DIP joint. There is a classic presentation of a hammertoe deformity and is  flexible and easily reduced all toes both feet, however #3 right the longest with ulceration to the tip of the to which has been selt tx as above and nail which has been self tx down to cuticle. Entire area red and irritated, but no cellulitis, no tunneling, no sinus tract, no drainage, no odor, mild periwound erythema, no evidence of infection\par  [FreeTextEntry1] : as above

## 2022-10-06 NOTE — REVIEW OF SYSTEMS
[Joint Swelling] : joint swelling [Joint Stiffness] : joint stiffness [Skin Lesions] : skin lesion [Skin Wound] : skin wound [Negative] : Heme/Lymph

## 2022-10-10 ENCOUNTER — APPOINTMENT (OUTPATIENT)
Dept: PODIATRY | Facility: CLINIC | Age: 82
End: 2022-10-10

## 2022-10-17 ENCOUNTER — RESULT REVIEW (OUTPATIENT)
Age: 82
End: 2022-10-17

## 2022-10-19 ENCOUNTER — TRANSCRIPTION ENCOUNTER (OUTPATIENT)
Age: 82
End: 2022-10-19

## 2022-10-19 ENCOUNTER — RESULT REVIEW (OUTPATIENT)
Age: 82
End: 2022-10-19

## 2022-10-20 ENCOUNTER — APPOINTMENT (OUTPATIENT)
Dept: GASTROENTEROLOGY | Facility: HOSPITAL | Age: 82
End: 2022-10-20

## 2022-10-20 RX ORDER — HYDROCORTISONE 25 MG/G
2.5 CREAM TOPICAL
Qty: 30 | Refills: 2 | Status: ACTIVE | COMMUNITY
Start: 2022-10-20 | End: 1900-01-01

## 2022-12-28 ENCOUNTER — APPOINTMENT (OUTPATIENT)
Dept: GASTROENTEROLOGY | Facility: CLINIC | Age: 82
End: 2022-12-28
Payer: MEDICARE

## 2022-12-28 VITALS
HEIGHT: 72 IN | DIASTOLIC BLOOD PRESSURE: 74 MMHG | HEART RATE: 104 BPM | SYSTOLIC BLOOD PRESSURE: 134 MMHG | WEIGHT: 170 LBS | OXYGEN SATURATION: 99 % | BODY MASS INDEX: 23.03 KG/M2

## 2022-12-28 DIAGNOSIS — Z80.0 FAMILY HISTORY OF MALIGNANT NEOPLASM OF DIGESTIVE ORGANS: ICD-10-CM

## 2022-12-28 PROCEDURE — 36415 COLL VENOUS BLD VENIPUNCTURE: CPT

## 2022-12-28 PROCEDURE — 99215 OFFICE O/P EST HI 40 MIN: CPT | Mod: 25

## 2022-12-28 RX ORDER — HYDROCORTISONE ACETATE 25 MG/1
25 SUPPOSITORY RECTAL TWICE DAILY
Qty: 14 | Refills: 2 | Status: ACTIVE | COMMUNITY
Start: 2022-12-28 | End: 1900-01-01

## 2022-12-28 NOTE — HISTORY OF PRESENT ILLNESS
[de-identified] : Presents  c/o 3  weeks  of anal pain with defecation. Associated with scant mucoid discharge.  Denies  nausea, vomiting, fever, chills, diarrhea, constipation, melena, hematemesis\par \par -Colonoscopy 10/2022: hemorrhoids / diverticulosis / adenomatous polyps\par \par Denies  nausea, vomiting, fever, chills, diarrhea, constipation, melena, hematemesis, BRBPR\par \par Colonoscopy 11/2016 secondary to history of colon cancer  ( s/p left hemicolectomy in 2009) revealed hemorrhoids, diverticulosis and a benign polyp

## 2022-12-28 NOTE — PHYSICAL EXAM
[General Appearance - Alert] : alert [General Appearance - In No Acute Distress] : in no acute distress [Sclera] : the sclera and conjunctiva were normal [Neck Appearance] : the appearance of the neck was normal [] : no respiratory distress [Abdomen Soft] : soft [No CVA Tenderness] : no ~M costovertebral angle tenderness [Abnormal Walk] : normal gait [Skin Color & Pigmentation] : normal skin color and pigmentation [No Focal Deficits] : no focal deficits [Oriented To Time, Place, And Person] : oriented to person, place, and time [FreeTextEntry1] : circumferential anal pain on palpation with ? fullnes.  No mass appreciated

## 2022-12-28 NOTE — REASON FOR VISIT
[Consultation] : a consultation visit [FreeTextEntry1] : Guaiac positive stools / history of colon cancer

## 2022-12-28 NOTE — ASSESSMENT
[FreeTextEntry1] : Anal pain x  3 weeks :  ? hemorrhoids. Some anal fullness without  mass appreciated. Some  relief with recticare.  Anusol suppository BID x 10 days.  Sitz bath.  Pelvic  MRI ordered. \par \par Pertinent available records reviewed\par

## 2022-12-29 LAB
ANION GAP SERPL CALC-SCNC: 10 MMOL/L
BUN SERPL-MCNC: 14 MG/DL
CALCIUM SERPL-MCNC: 9.6 MG/DL
CHLORIDE SERPL-SCNC: 106 MMOL/L
CO2 SERPL-SCNC: 26 MMOL/L
CREAT SERPL-MCNC: 0.78 MG/DL
EGFR: 88 ML/MIN/1.73M2
GLUCOSE SERPL-MCNC: 141 MG/DL
POTASSIUM SERPL-SCNC: 4.3 MMOL/L
SODIUM SERPL-SCNC: 142 MMOL/L

## 2023-01-09 ENCOUNTER — TRANSCRIPTION ENCOUNTER (OUTPATIENT)
Age: 83
End: 2023-01-09

## 2023-01-12 ENCOUNTER — RX RENEWAL (OUTPATIENT)
Age: 83
End: 2023-01-12

## 2023-01-12 ENCOUNTER — APPOINTMENT (OUTPATIENT)
Dept: CARDIOLOGY | Facility: CLINIC | Age: 83
End: 2023-01-12

## 2023-01-12 RX ORDER — HYDROCORTISONE 2.5% 25 MG/G
2.5 CREAM TOPICAL
Qty: 30 | Refills: 2 | Status: ACTIVE | COMMUNITY
Start: 2023-01-12 | End: 1900-01-01

## 2023-01-23 ENCOUNTER — NON-APPOINTMENT (OUTPATIENT)
Age: 83
End: 2023-01-23

## 2023-01-23 ENCOUNTER — APPOINTMENT (OUTPATIENT)
Dept: CARDIOLOGY | Facility: CLINIC | Age: 83
End: 2023-01-23
Payer: MEDICARE

## 2023-01-23 VITALS
HEIGHT: 72 IN | BODY MASS INDEX: 21.4 KG/M2 | WEIGHT: 158 LBS | OXYGEN SATURATION: 98 % | SYSTOLIC BLOOD PRESSURE: 148 MMHG | RESPIRATION RATE: 14 BRPM | HEART RATE: 67 BPM | DIASTOLIC BLOOD PRESSURE: 70 MMHG

## 2023-01-23 DIAGNOSIS — I48.92 UNSPECIFIED ATRIAL FLUTTER: ICD-10-CM

## 2023-01-23 DIAGNOSIS — I71.21 ANEURYSM OF THE ASCENDING AORTA, WITHOUT RUPTURE: ICD-10-CM

## 2023-01-23 PROCEDURE — 93000 ELECTROCARDIOGRAM COMPLETE: CPT

## 2023-01-23 PROCEDURE — 99214 OFFICE O/P EST MOD 30 MIN: CPT

## 2023-01-23 NOTE — REVIEW OF SYSTEMS
How Is Your Wound Healing?: healing well Additional History: Patient had Mohs 2 weeks ago. The patient denies any concerns and says “It feels fine.” [Negative] : Heme/Lymph [FreeTextEntry2] : vertigo

## 2023-01-23 NOTE — ASSESSMENT
[FreeTextEntry1] : 83 yo male with hypertension, atrial flutter/fibrillation (on Eliquis), CVA in early April 2022 s/p thrombectomy at [facility], and acute systolic heart failure per 4/22/22 echocardiogram, which reported reported moderately dilated LV with moderate global hypokinesis & LVEF 33%, which was new compared to 4/2021 echo. He underwent Lexiscan nuclear stress test on 6/10/22, which reported a small apical infarct and dilated LV with moderate global hypokinesis, LVEF 34%. Cardiac cath only demonstrated 40-50% prox+mid LAD disease and global hypokinesis with LVEF 30-35%.\par \par Patient is clinically stable from HFrEF standpoint. \par Will continue Entresto 24 mg/26 mg po bid and metoprolol succinate 25 mg po daily.\par Will repeat echocardiogram at follow-up in 4 months to reassess LVEF on current medical therapy.\par \par Will continue metoprolol succinate 25 mg po daily for afib rate control.\par Given [id number] score = 6, will continue Eliquis 5 mg po bid for afib thromboembolic prophylaxis.\par \par Will continue amlodipine 10 mg, metoprolol succinate 25 mg, HCTZ 25 mg po daily, and Entresto for BP control.

## 2023-01-23 NOTE — CARDIOLOGY SUMMARY
[de-identified] : \par 1/23/23 ECG: Atrial fibrillation, rate 73 bpm, RBBB & LAFB (bifascicular block)\par 4/25/22 ECG: Atrial fibrillation, rate 78 bpm, RBBB & LAFB (bifascicular block)\par 9/20/21 ECG: Atrial fibrillation, rate 74 bpm, RBBB & LAFB (bifascicular block)\par 8/17/21 ECG: Atrial flutter with variable AV block, rate 97 bpm, RBBB+LAFB (bifascicular block)\par 3/3/21 ECG: Sinus, rate 77 bpm, PVC, PAC, RBBB, LAD\par  [de-identified] : \par 6/10/22 Lexiscan Myoview (at [facility]): No CP or ECG changes. Small apical infarct. Dilated LV with moderate global hypokinesis, LVEF 34%.\par  [de-identified] : \par 4/22/22 Echo (at [facility]): Moderately dilated LV with moderate global hypokinesis, LVEF 33%. Severe biatrial enlargement. Mild AR. Mild to mod MR. Mod TR. Mild pulm HTN. Dilated aortic root (4.2 cm).\par \par 4/19/21 Echo: Mildly increased LV size with low normal systolic function, LVEF 50-55%. Grade II DD. Moderately increased LA volume index (42 ml/m2). Mild to mod MR. AV sclerosis. Mild to mod AR. Mild TR. Mild AL. Mildly dilated aortic root (3.9 cm).\par \par 2/19/19 Echo: Normal LV size and systolic function, LVEF 60%. Grade II DD. Mild conc LVH. Severely increased LA volume index. Mild AV sclerosis. Mod AR. Mild MR. Mild TR. \par  [de-identified] : \par 1/7/23 CTA chest (at [facility]): No pulmonary embolism. 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 [de-identified] : \par 6/29/22 Cardiac cath (at [facility]): \par 40-50% prox + mid LAD stenosis (iFR 0.92)\par Mild luminal irreg in LCx and RCA\par LVEF 30-35%, global hypokinesis\par Normal LVEDP

## 2023-01-23 NOTE — HISTORY OF PRESENT ILLNESS
[FreeTextEntry1] : 83 yo male with hypertension, atrial flutter/fibrillation (on Eliquis), CVA in early April 2022 s/p thrombectomy at [facility], and acute systolic heart failure per 4/22/22 echocardiogram, which reported reported moderately dilated LV with moderate global hypokinesis & LVEF 33%, which was new compared to 4/2021 echo. He underwent Lexiscan nuclear stress test on 6/10/22, which reported a small apical infarct and dilated LV with moderate global hypokinesis, LVEF 34%. Cardiac cath only demonstrated 40-50% prox+mid LAD disease and global hypokinesis with LVEF 30-35%.\par \par Patient was recently hospitalized at [facility] from 1/7/23 - 1/9/23 for COVID-19 pneumonia for which he was treated with antibiotics, Decadron, and Remdesivir. Patient presents today for follow-up. Patient denies chest pain, dyspnea, palpitations, syncope, edema, melena, hematochezia, or hematemesis.\par \par PCP: Adrian Paz ([facility])\par Pulm: Froylan Cruz

## 2023-02-09 ENCOUNTER — APPOINTMENT (OUTPATIENT)
Dept: PULMONOLOGY | Facility: CLINIC | Age: 83
End: 2023-02-09

## 2023-02-17 ENCOUNTER — APPOINTMENT (OUTPATIENT)
Dept: PULMONOLOGY | Facility: CLINIC | Age: 83
End: 2023-02-17
Payer: MEDICARE

## 2023-02-17 VITALS
WEIGHT: 158 LBS | HEIGHT: 72 IN | DIASTOLIC BLOOD PRESSURE: 80 MMHG | SYSTOLIC BLOOD PRESSURE: 160 MMHG | TEMPERATURE: 96.7 F | BODY MASS INDEX: 21.4 KG/M2

## 2023-02-17 DIAGNOSIS — R91.1 SOLITARY PULMONARY NODULE: ICD-10-CM

## 2023-02-17 DIAGNOSIS — J44.1 CHRONIC OBSTRUCTIVE PULMONARY DISEASE WITH (ACUTE) EXACERBATION: ICD-10-CM

## 2023-02-17 PROCEDURE — 99214 OFFICE O/P EST MOD 30 MIN: CPT

## 2023-02-17 NOTE — ASSESSMENT
[FreeTextEntry1] : Patient with moderate COPD and a left upper lobe masslike infiltrate on CAT scan I believe January 7. Patient is advised to repeat his CAT scan in April. He is to continue with breo and albuterol on a p.r.n. basis. He is to follow up with me in 3 months. [Negative] : Genitourinary

## 2023-02-17 NOTE — PHYSICAL EXAM
[No Acute Distress] : no acute distress [Well Nourished] : well nourished [Well Developed] : well developed [Well-Appearing] : well-appearing [Normal Sclera/Conjunctiva] : normal sclera/conjunctiva [PERRL] : pupils equal round and reactive to light [EOMI] : extraocular movements intact [Normal Outer Ear/Nose] : the outer ears and nose were normal in appearance [Normal Oropharynx] : the oropharynx was normal [No JVD] : no jugular venous distention [No Lymphadenopathy] : no lymphadenopathy [Supple] : supple [Thyroid Normal, No Nodules] : the thyroid was normal and there were no nodules present [No Respiratory Distress] : no respiratory distress  [No Accessory Muscle Use] : no accessory muscle use [Clear to Auscultation] : lungs were clear to auscultation bilaterally [Normal Rate] : normal rate  [Normal S1, S2] : normal S1 and S2 [No Murmur] : no murmur heard [No Carotid Bruits] : no carotid bruits [No Abdominal Bruit] : a ~M bruit was not heard ~T in the abdomen [No Varicosities] : no varicosities [Pedal Pulses Present] : the pedal pulses are present [No Edema] : there was no peripheral edema [No Palpable Aorta] : no palpable aorta [No Extremity Clubbing/Cyanosis] : no extremity clubbing/cyanosis [Soft] : abdomen soft [Non Tender] : non-tender [Non-distended] : non-distended [No Masses] : no abdominal mass palpated [No HSM] : no HSM [Normal Bowel Sounds] : normal bowel sounds [Normal Posterior Cervical Nodes] : no posterior cervical lymphadenopathy [Normal Anterior Cervical Nodes] : no anterior cervical lymphadenopathy [No Focal Deficits] : no focal deficits [Normal Gait] : normal gait [Normal Affect] : the affect was normal [Normal Insight/Judgement] : insight and judgment were intact [de-identified] : irreg irreg

## 2023-02-17 NOTE — HISTORY OF PRESENT ILLNESS
[FreeTextEntry1] : Followup COPD and recent hospitalization [de-identified] : Patient carries diagnosis of moderate COPD. He was recently hospitalized the beginning of January with covid. He was found to have a left upper lobe masslike infiltrate. He was treated with Levaquin and discharged. He is doing fine now with no cough or shortness of breath. He is maintained on Breo. He generally feels well except for shoulder pain. He occasionally takes albuterol is unclear why. He again denies shortness of breath. He is a fair historian.

## 2023-03-15 ENCOUNTER — RESULT REVIEW (OUTPATIENT)
Age: 83
End: 2023-03-15

## 2023-03-17 ENCOUNTER — NON-APPOINTMENT (OUTPATIENT)
Age: 83
End: 2023-03-17

## 2023-03-24 ENCOUNTER — APPOINTMENT (OUTPATIENT)
Dept: GASTROENTEROLOGY | Facility: CLINIC | Age: 83
End: 2023-03-24
Payer: MEDICARE

## 2023-03-24 VITALS
HEIGHT: 72 IN | DIASTOLIC BLOOD PRESSURE: 84 MMHG | HEART RATE: 89 BPM | WEIGHT: 158 LBS | SYSTOLIC BLOOD PRESSURE: 146 MMHG | OXYGEN SATURATION: 98 % | BODY MASS INDEX: 21.4 KG/M2

## 2023-03-24 PROCEDURE — 99214 OFFICE O/P EST MOD 30 MIN: CPT

## 2023-03-24 NOTE — HISTORY OF PRESENT ILLNESS
[de-identified] : Prsents  with persistent   anal pain with defecation.  Denies  BRBPR. \par \par Evaluated 12/2022 for  c/o 3  weeks  of anal pain with defecation. Associated with scant mucoid discharge.  Anusol HC / Recticare recommmeded. \par Denied  nausea, vomiting, fever, chills, diarrhea, constipation, melena, hematemesis\par -Colonoscopy 10/2022: hemorrhoids / diverticulosis / adenomatous polyps\par \par Rectal / Pelvic  MRI ( 3/2023) notable  for hydrocele and ? varicocele...Urology follow up recommended\par \par \par Denies  nausea, vomiting, fever, chills, diarrhea, constipation, melena, hematemesis, BRBPR\par \par Colonoscopy 11/2016 secondary to history of colon cancer  ( s/p left hemicolectomy in 2009) revealed hemorrhoids, diverticulosis and a benign polyp

## 2023-03-24 NOTE — PHYSICAL EXAM
[General Appearance - Alert] : alert [General Appearance - In No Acute Distress] : in no acute distress [Neck Appearance] : the appearance of the neck was normal [Sclera] : the sclera and conjunctiva were normal [] : no respiratory distress [Abdomen Soft] : soft [No CVA Tenderness] : no ~M costovertebral angle tenderness [Abnormal Walk] : normal gait [Skin Color & Pigmentation] : normal skin color and pigmentation [No Focal Deficits] : no focal deficits [Oriented To Time, Place, And Person] : oriented to person, place, and time [FreeTextEntry1] :   No mass appreciated / anal skin tag  ( patient refused internal exam secondary to pain)

## 2023-03-24 NOTE — ASSESSMENT
[FreeTextEntry1] : 1. Anal pain: persistent  / intermittent.  No perianal findings on pelvic   MRI. Some  relief with recticare /   Anusol suppository BID x 10 days.  Sitz bath.  Surgical evaluation \par \par \par 2. Urology follow up for varicocele\par Pertinent available records reviewed\par

## 2023-03-28 ENCOUNTER — TRANSCRIPTION ENCOUNTER (OUTPATIENT)
Age: 83
End: 2023-03-28

## 2023-03-31 ENCOUNTER — NON-APPOINTMENT (OUTPATIENT)
Age: 83
End: 2023-03-31

## 2023-03-31 ENCOUNTER — APPOINTMENT (OUTPATIENT)
Dept: COLORECTAL SURGERY | Facility: CLINIC | Age: 83
End: 2023-03-31
Payer: MEDICARE

## 2023-03-31 VITALS
SYSTOLIC BLOOD PRESSURE: 138 MMHG | RESPIRATION RATE: 16 BRPM | HEART RATE: 64 BPM | OXYGEN SATURATION: 97 % | WEIGHT: 154.86 LBS | HEIGHT: 74 IN | DIASTOLIC BLOOD PRESSURE: 72 MMHG | BODY MASS INDEX: 19.87 KG/M2

## 2023-03-31 DIAGNOSIS — K64.9 UNSPECIFIED HEMORRHOIDS: ICD-10-CM

## 2023-03-31 DIAGNOSIS — K59.00 CONSTIPATION, UNSPECIFIED: ICD-10-CM

## 2023-03-31 DIAGNOSIS — K62.5 HEMORRHAGE OF ANUS AND RECTUM: ICD-10-CM

## 2023-03-31 PROCEDURE — 99203 OFFICE O/P NEW LOW 30 MIN: CPT

## 2023-03-31 RX ORDER — ALBUTEROL SULFATE 90 UG/1
108 (90 BASE) INHALANT RESPIRATORY (INHALATION)
Qty: 1 | Refills: 5 | Status: DISCONTINUED | COMMUNITY
Start: 2023-02-17 | End: 2023-03-31

## 2023-03-31 RX ORDER — DOCUSATE SODIUM 100 MG/1
100 CAPSULE, LIQUID FILLED ORAL TWICE DAILY
Qty: 60 | Refills: 1 | Status: ACTIVE | COMMUNITY
Start: 2023-03-31 | End: 1900-01-01

## 2023-04-12 ENCOUNTER — APPOINTMENT (OUTPATIENT)
Dept: CARDIOLOGY | Facility: CLINIC | Age: 83
End: 2023-04-12

## 2023-04-27 ENCOUNTER — APPOINTMENT (OUTPATIENT)
Dept: CARDIOLOGY | Facility: CLINIC | Age: 83
End: 2023-04-27
Payer: MEDICARE

## 2023-04-27 ENCOUNTER — NON-APPOINTMENT (OUTPATIENT)
Age: 83
End: 2023-04-27

## 2023-04-27 VITALS
RESPIRATION RATE: 14 BRPM | OXYGEN SATURATION: 97 % | DIASTOLIC BLOOD PRESSURE: 70 MMHG | WEIGHT: 154 LBS | HEIGHT: 74 IN | HEART RATE: 90 BPM | SYSTOLIC BLOOD PRESSURE: 143 MMHG | BODY MASS INDEX: 19.76 KG/M2

## 2023-04-27 PROCEDURE — 36415 COLL VENOUS BLD VENIPUNCTURE: CPT

## 2023-04-27 PROCEDURE — 93000 ELECTROCARDIOGRAM COMPLETE: CPT

## 2023-04-27 PROCEDURE — 99214 OFFICE O/P EST MOD 30 MIN: CPT | Mod: 25

## 2023-04-27 RX ORDER — APIXABAN 5 MG/1
5 TABLET, FILM COATED ORAL
Qty: 180 | Refills: 3 | Status: ACTIVE | COMMUNITY
Start: 2021-08-17 | End: 1900-01-01

## 2023-04-27 RX ORDER — SACUBITRIL AND VALSARTAN 24; 26 MG/1; MG/1
24-26 TABLET, FILM COATED ORAL
Qty: 180 | Refills: 3 | Status: ACTIVE | COMMUNITY
Start: 1900-01-01 | End: 1900-01-01

## 2023-04-27 RX ORDER — FUROSEMIDE 40 MG/1
40 TABLET ORAL
Qty: 45 | Refills: 3 | Status: ACTIVE | COMMUNITY
Start: 1900-01-01 | End: 1900-01-01

## 2023-04-27 NOTE — CARDIOLOGY SUMMARY
[de-identified] : \par 4/27/23 ECG: Atrial fibrillation, rate 86 bpm, RBBB+LAFB (bifascicular block)\par 3/26/23 ECG (at [place]): Atrial fibrillation, rate 80 bpm, RBBB & LAFB (bifascicular block)\par 1/23/23 ECG: Atrial fibrillation, rate 73 bpm, RBBB & LAFB (bifascicular block)\par 4/25/22 ECG: Atrial fibrillation, rate 78 bpm, RBBB & LAFB (bifascicular block)\par  [de-identified] : \par 6/10/22 Lexiscan Myoview (at [place]): No CP or ECG changes. Small apical infarct. Dilated LV with moderate global hypokinesis, LVEF 34%.\par  [de-identified] : \par 3/27/23 Echo (at [place]): Moderately reduces systolic function globally, LVEF 35%. Normal RV size and systolic function. Mod biatrial enlargement. Mod AR/MR/TR. Mild pulm HTN with PASP 41 mmHg. \par \par 4/22/22 Echo (at [place]): Moderately dilated LV with moderate global hypokinesis, LVEF 33%. Severe biatrial enlargement. Mild AR. Mild to mod MR. Mod TR. Mild pulm HTN. Dilated aortic root (4.2 cm).\par \par 4/19/21 Echo: Mildly increased LV size with low normal systolic function, LVEF 50-55%. Grade II DD. Moderately increased LA volume index (42 ml/m2). Mild to mod MR. AV sclerosis. Mild to mod AR. Mild TR. Mild SD. Mildly dilated aortic root (3.9 cm).\par \par 2/19/19 Echo: Normal LV size and systolic function, LVEF 60%. Grade II DD. Mild conc LVH. Severely increased LA volume index. Mild AV sclerosis. Mod AR. Mild MR. Mild TR. \par  [de-identified] : \par 1/7/23 CTA chest (at [place]): No pulmonary embolism. 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

## 2023-04-27 NOTE — PHYSICAL EXAM
[Well Developed] : well developed [Well Nourished] : well nourished [No Acute Distress] : no acute distress [Normal Conjunctiva] : normal conjunctiva [Normal Venous Pressure] : normal venous pressure [No Carotid Bruit] : no carotid bruit [Normal Rate] : normal [Irregularly Irregular] : irregularly irregular [Normal S1] : normal S1 [Normal S2] : normal S2 [No Murmur] : no murmurs heard [No Pitting Edema] : no pitting edema present [2+] : left 2+ [Clear Lung Fields] : clear lung fields [Good Air Entry] : good air entry [No Respiratory Distress] : no respiratory distress  [No Edema] : no edema [No Cyanosis] : no cyanosis [No Clubbing] : no clubbing [Moves all extremities] : moves all extremities [No Focal Deficits] : no focal deficits [Normal Speech] : normal speech [Alert and Oriented] : alert and oriented [Normal memory] : normal memory [S3] : no S3 [S4] : no S4 [Left Carotid Bruit] : no bruit heard over the left carotid [Right Carotid Bruit] : no bruit heard over the right carotid

## 2023-04-27 NOTE — HISTORY OF PRESENT ILLNESS
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 He underwent Lexiscan nuclear stress test on 6/10/22, which reported a small apical infarct and dilated LV with moderate global hypokinesis, LVEF 34%. Cardiac cath only demonstrated 40-50% prox+mid LAD disease and global hypokinesis with LVEF 30-35%.\par \par Patient was recently hospitalized at [place] from 3/26-3/28/23 for acute on chornic HFrEF. He was diuresed with improvement in his symptoms and discharged on furosemide 40 mg po daily. Patient denies chest pain, dyspnea, palpitations, syncope, edema, melena, hematochezia, or hematemesis.\par \par PCP: Adrian Paz ([facility])\par Pulm: Froylan Cruz

## 2023-04-27 NOTE — ASSESSMENT
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 He underwent Lexiscan nuclear stress test on 6/10/22, which reported a small apical infarct and dilated LV with moderate global hypokinesis, LVEF 34%. Cardiac cath only demonstrated 40-50% prox+mid LAD disease and global hypokinesis with LVEF 30-35%.\par \par Patient is clinically stable from HFrEF standpoint and appears euvolemic. \par Will continue Entresto 24 mg/26 mg po bid and metoprolol succinate 25 mg po daily.\par Will change furosemide 40 mg po daily to every other day as patient is currently euvolemic and patient reports too frequent urination.\par Will check BMP today.\par \par Will continue metoprolol succinate 25 mg po daily for afib rate control.\par Given [id number] score = 6, will continue Eliquis 5 mg po bid for afib thromboembolic prophylaxis.\par \par Will continue amlodipine 10 mg, metoprolol succinate 25 mg, furosemide, and Entresto for BP control.

## 2023-04-28 LAB
ANION GAP SERPL CALC-SCNC: 14 MMOL/L
BUN SERPL-MCNC: 23 MG/DL
CALCIUM SERPL-MCNC: 9.8 MG/DL
CHLORIDE SERPL-SCNC: 104 MMOL/L
CO2 SERPL-SCNC: 26 MMOL/L
CREAT SERPL-MCNC: 0.95 MG/DL
EGFR: 79 ML/MIN/1.73M2
GLUCOSE SERPL-MCNC: 99 MG/DL
POTASSIUM SERPL-SCNC: 4.5 MMOL/L
SODIUM SERPL-SCNC: 144 MMOL/L

## 2023-05-22 ENCOUNTER — NON-APPOINTMENT (OUTPATIENT)
Age: 83
End: 2023-05-22

## 2023-05-22 ENCOUNTER — APPOINTMENT (OUTPATIENT)
Dept: CARDIOLOGY | Facility: CLINIC | Age: 83
End: 2023-05-22
Payer: MEDICARE

## 2023-05-22 VITALS
DIASTOLIC BLOOD PRESSURE: 73 MMHG | WEIGHT: 166 LBS | TEMPERATURE: 98 F | RESPIRATION RATE: 16 BRPM | OXYGEN SATURATION: 98 % | BODY MASS INDEX: 21.31 KG/M2 | HEART RATE: 85 BPM | SYSTOLIC BLOOD PRESSURE: 134 MMHG

## 2023-05-22 PROCEDURE — 93000 ELECTROCARDIOGRAM COMPLETE: CPT

## 2023-05-22 PROCEDURE — 99214 OFFICE O/P EST MOD 30 MIN: CPT | Mod: 25

## 2023-05-22 NOTE — REVIEW OF SYSTEMS
[Negative] : Heme/Lymph [SOB] : no shortness of breath [Chest Discomfort] : no chest discomfort [Lower Ext Edema] : lower extremity edema [Palpitations] : no palpitations [Syncope] : no syncope [FreeTextEntry2] : vertigo

## 2023-05-22 NOTE — CARDIOLOGY SUMMARY
[de-identified] : \par 5/22/23 ECG: Atrial fibrillation, rate 80 bpm, RBBB+LAFB (bifascicular block)\par 4/27/23 ECG: Atrial fibrillation, rate 86 bpm, RBBB+LAFB (bifascicular block)\par 3/26/23 ECG (at [place]): Atrial fibrillation, rate 80 bpm, RBBB & LAFB (bifascicular block)\par 1/23/23 ECG: Atrial fibrillation, rate 73 bpm, RBBB & LAFB (bifascicular block)\par  [de-identified] : \par 6/10/22 Lexiscan Myoview (at [place]): No CP or ECG changes. Small apical infarct. Dilated LV with moderate global hypokinesis, LVEF 34%.\par  [de-identified] : \par 3/27/23 Echo (at [place]): Moderately reduces systolic function globally, LVEF 35%. Normal RV size and systolic function. Mod biatrial enlargement. Mod AR/MR/TR. Mild pulm HTN with PASP 41 mmHg. \par \par 4/22/22 Echo (at [place]): Moderately dilated LV with moderate global hypokinesis, LVEF 33%. Severe biatrial enlargement. Mild AR. Mild to mod MR. Mod TR. Mild pulm HTN. Dilated aortic root (4.2 cm).\par \par 4/19/21 Echo: Mildly increased LV size with low normal systolic function, LVEF 50-55%. Grade II DD. Moderately increased LA volume index (42 ml/m2). Mild to mod MR. AV sclerosis. Mild to mod AR. Mild TR. Mild TN. Mildly dilated aortic root (3.9 cm).\par \par 2/19/19 Echo: Normal LV size and systolic function, LVEF 60%. Grade II DD. Mild conc LVH. Severely increased LA volume index. Mild AV sclerosis. Mod AR. Mild MR. Mild TR. \par  [de-identified] : \par 1/7/23 CTA chest (at [place]): No pulmonary embolism. 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

## 2023-05-22 NOTE — ASSESSMENT
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, and acute systolic heart failure per 4/22/22 echocardiogram, which reported reported moderately dilated LV with moderate global hypokinesis & LVEF 33%, which was new compared to 4/2021 echo. He underwent Lexiscan nuclear stress test on 6/10/22, which reported a small apical infarct and dilated LV with moderate global hypokinesis, LVEF 34%. Cardiac cath only demonstrated 40-50% prox+mid LAD disease and global hypokinesis with LVEF 30-35%.\par \par Patient is clinically stable from HFrEF standpoint and appears euvolemic. \par Suspect that reported pedal edema is due to venous insufficiency. \par Will continue Entresto 24 mg/26 mg po bid, metoprolol succinate 25 mg po daily, and furosemide 40 mg po every other day.\par \par Will continue metoprolol succinate 25 mg po daily for afib rate control.\par Given [id number] score = 6, will continue Eliquis 5 mg po bid for afib thromboembolic prophylaxis.\par \par Will continue metoprolol succinate 25 mg, furosemide, and Entresto for BP control.

## 2023-05-22 NOTE — HISTORY OF PRESENT ILLNESS
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 He underwent Lexiscan nuclear stress test on 6/10/22, which reported a small apical infarct and dilated LV with moderate global hypokinesis, LVEF 34%. Cardiac cath only demonstrated 40-50% prox+mid LAD disease and global hypokinesis with LVEF 30-35%.\par \par Patient was recently evaluated at [facility] on 5/5/23 for reported fatigue and leg edema. CXR only reported possible trace pleural effusions and labs were unremarkable. He currently denies SOB or CP. He reports pedal edema. \par \par PCP: Adrian Paz ([facility])\par Pulm: Froylan Cruz

## 2023-05-26 ENCOUNTER — APPOINTMENT (OUTPATIENT)
Dept: COLORECTAL SURGERY | Facility: CLINIC | Age: 83
End: 2023-05-26
Payer: MEDICARE

## 2023-05-26 VITALS
BODY MASS INDEX: 21.3 KG/M2 | RESPIRATION RATE: 18 BRPM | HEART RATE: 74 BPM | HEIGHT: 74 IN | DIASTOLIC BLOOD PRESSURE: 82 MMHG | OXYGEN SATURATION: 99 % | WEIGHT: 166 LBS | SYSTOLIC BLOOD PRESSURE: 118 MMHG

## 2023-05-26 DIAGNOSIS — K64.4 RESIDUAL HEMORRHOIDAL SKIN TAGS: ICD-10-CM

## 2023-05-26 PROCEDURE — 99213 OFFICE O/P EST LOW 20 MIN: CPT

## 2023-05-26 NOTE — ASSESSMENT
[FreeTextEntry1] : 82-year-old gentleman with an anorectal situation that has improved under my care.\par \par At the beginning of our visit we had asked him if he is using the nifedipine ointment that was previously prescribed for him.  At the end of my office visit, as we are discussing the situation it appears that the patient never obtained the compounded ointment but in fact has been using some ointments prescribed by other physicians.  He does not know the name of those ointments.\par \par At this time, given the logistic challenges of this patient paying for a compounded ointment that is probably not insurance covered and the patient may need to travel to an unfamiliar pharmacy to , we will stead going a different direction and I have provided him a sample packet of Calmoseptine which has a zinc oxide, calamine, menthol formula and provides both protection and soothing symptom relief.  I provided to the patient instructions on a split pea smidge amount of the ointment to be applied into the opening of the anal canal twice daily as needed.\par \par He is also instructed that he can buy this product at a pharmacy or online [employer] etc. if he wishes to continue it.\par \par The patient is offered the opportunity to see me in the future however at this time there is no recommendation for planned repeat office visit.

## 2023-05-26 NOTE — PHYSICAL EXAM
[FreeTextEntry1] : The patient is limiting the physical exam, declining to allow digital rectal exam.\par \par Visualization of the anal canal shows a quiet left posterior anal verge skin tag less than 1 cm.  This is what the patient is identifying as the "lump".  There is no visible anal fissure.\par \par Digital rectal exam is again deferred at patient request today.

## 2023-05-26 NOTE — HISTORY OF PRESENT ILLNESS
[FreeTextEntry1] : Mr. Kearney, an 82-year-old gentleman returns for ongoing management.  He reports, with the help of  (ID number [id number]) that his symptoms have improved.  The sharp pain is now more of a dull less intense less frequent "burning" type pain.  He also has a sense of a "lump" in the anal area.  He denies fevers or chills or worsening situation at the anal canal.  He denies discharge.  \par The burning sensation is associated with bowel movements.  He also reports seeing a smidge small toilet paper blood occasionally, cleaning up after a bowel movement.

## 2023-06-20 ENCOUNTER — APPOINTMENT (OUTPATIENT)
Dept: CARDIOLOGY | Facility: CLINIC | Age: 83
End: 2023-06-20

## 2023-07-20 ENCOUNTER — APPOINTMENT (OUTPATIENT)
Dept: COLORECTAL SURGERY | Facility: CLINIC | Age: 83
End: 2023-07-20
Payer: MEDICARE

## 2023-07-20 VITALS
OXYGEN SATURATION: 96 % | DIASTOLIC BLOOD PRESSURE: 60 MMHG | SYSTOLIC BLOOD PRESSURE: 140 MMHG | BODY MASS INDEX: 19.26 KG/M2 | RESPIRATION RATE: 18 BRPM | WEIGHT: 150 LBS | HEART RATE: 81 BPM

## 2023-07-20 DIAGNOSIS — K60.0 ACUTE ANAL FISSURE: ICD-10-CM

## 2023-07-20 DIAGNOSIS — R10.2 PELVIC AND PERINEAL PAIN: ICD-10-CM

## 2023-07-20 PROCEDURE — 99214 OFFICE O/P EST MOD 30 MIN: CPT

## 2023-07-24 PROBLEM — R10.2 PELVIC PAIN: Status: ACTIVE | Noted: 2023-07-24

## 2023-07-24 PROBLEM — K60.0 ACUTE ANAL FISSURE: Status: ACTIVE | Noted: 2023-05-26

## 2023-07-24 NOTE — PHYSICAL EXAM
[FreeTextEntry1] : Anorectal examination with digital rectal exam and anoscopic exam does not reveal an obvious anorectal pathology.  The patient has some shoddy quiet noninflamed hemorrhoids.  There is no appreciable tenderness during digital rectal exam.  There is no visible anal fissure within the anal canal during anoscopic exam.

## 2023-07-24 NOTE — HISTORY OF PRESENT ILLNESS
[FreeTextEntry1] : Mr. Christel Lema presents today, accompanied by his daughter.  This is an 82-year-old gentleman with a several month history of anorectal and pelvic pain.  I saw this gentleman initially in late May of this year.\par \par I prescribed him compounded nifedipine ointment to treat a presumed anal fissure or anal abrasion source.  The patient did not fill the prescription or use the medication.\par \par Additionally my review of the record indicates that gastroenterologist Dr. Mckee also saw the patient for anal and pelvic pain with MRI of the pelvis performed March 15, 2023.  "No perianal abnormality.  Moderate right hydrocele.  Possible bilateral varicocele.  If clinically indicated would consider scrotal ultrasound.\par \par Patient also underwent a normal colonoscopy in October 2022 with Dr. Mckee.\par \par Mr. Kearney reports that the pain is located mostly within the anorectum.  He says it is not associated with bowel movements however it is worse during bowel movements.  He reports the pain awakens him from sleep.\par \par He denies fever or chills\par \par

## 2023-07-24 NOTE — CONSULT LETTER
[Dear  ___] : Dear  [unfilled], [Consult Letter:] : I had the pleasure of evaluating your patient, [unfilled]. [Consult Closing:] : Thank you very much for allowing me to participate in the care of this patient.  If you have any questions, please do not hesitate to contact me. [Sincerely,] : Sincerely, [DrGisel  ___] : Dr. GLEASON [DrGisel ___] : Dr. GLEASON [FreeTextEntry2] : Andrzej Mckee MD\kaylin Zimmerman MD\par Daren Michelle MD [FreeTextEntry1] : Mr. Kearney returns, this time accompanied by his daughter.  It is the second visit for this gentleman.  His anorectal pelvic pain is somewhat atypical for an anal fissure in that it is patient reported to be constant, not only associated with defecation.  However I see that you saw the patient for similar complaints in February and March of this year, with MRI pelvic evaluation nondiagnostic.  There was mention of possible hydrocele varicocele.  The patient has been seen previously by Dr. Zimmerman, and I indicated to the patient that if I am unable to bring symptomatic relief, repeat urologic assessment may be of value.\par \par I also note your normal colonoscopy from October last year.\par \par His daughter has promised to fill the compounded nifedipine topical ointment as previously prescribed, with better patient compliance.  I have invited Mr. Kearney to return in 1 month for ongoing assessment and evaluation. [FreeTextEntry3] : Tk Gallegos MD FASCRS\par

## 2023-07-24 NOTE — ASSESSMENT
[FreeTextEntry1] : This is an 82-year-old gentleman who has had now anorectal, deep pelvic pain for the past months.  MRI evaluation of the pelvis is without abnormality that would immediately explain the patient's symptoms.\par \par I discussed with the patient and his accompanying daughter theneedtotrythenifedipine ointment, to be applied 2 or 3 times daily into the anal canal.  I also discussed with the patient the use of warm heating pad or warm bath, and the appropriate use of nonsteroidal pain medication.\par \par Of asked the patient to follow-up with me in approximately 4 weeks.  He is also invited to contact me if he feels his symptoms are worsening during the appropriate usage of the nifedipine ointment.\par \par Given the MRI, for the same clinical indication, in March of this year I am not in need of immediate repeat radiologic evaluation.  There is always a opportunity for exam under anesthesia if the patient's symptoms warrant.\par \par I have also asked the patient to follow-up with his family physician on issues of any urologic evaluation.

## 2023-08-17 ENCOUNTER — NON-APPOINTMENT (OUTPATIENT)
Age: 83
End: 2023-08-17

## 2023-08-17 ENCOUNTER — APPOINTMENT (OUTPATIENT)
Dept: CARDIOLOGY | Facility: CLINIC | Age: 83
End: 2023-08-17
Payer: MEDICARE

## 2023-08-17 VITALS
HEIGHT: 74 IN | HEART RATE: 60 BPM | OXYGEN SATURATION: 98 % | WEIGHT: 154 LBS | SYSTOLIC BLOOD PRESSURE: 144 MMHG | RESPIRATION RATE: 14 BRPM | BODY MASS INDEX: 19.76 KG/M2 | DIASTOLIC BLOOD PRESSURE: 75 MMHG

## 2023-08-17 DIAGNOSIS — M54.2 CERVICALGIA: ICD-10-CM

## 2023-08-17 PROCEDURE — 93000 ELECTROCARDIOGRAM COMPLETE: CPT

## 2023-08-17 PROCEDURE — 99214 OFFICE O/P EST MOD 30 MIN: CPT | Mod: 25

## 2023-08-17 RX ORDER — METOPROLOL SUCCINATE 25 MG/1
25 TABLET, EXTENDED RELEASE ORAL
Qty: 90 | Refills: 3 | Status: ACTIVE | COMMUNITY
Start: 2021-03-12 | End: 1900-01-01

## 2023-08-17 RX ORDER — SPIRONOLACTONE 25 MG/1
25 TABLET ORAL
Refills: 0 | Status: ACTIVE | COMMUNITY

## 2023-08-17 NOTE — HISTORY OF PRESENT ILLNESS
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 He underwent Lexiscan nuclear stress test on 6/10/22, which reported a small apical infarct and dilated LV with moderate global hypokinesis, LVEF 34%. Cardiac cath only demonstrated 40-50% prox+mid LAD disease and global hypokinesis with LVEF 30-35%.  Patient presents today for follow-up. Patient denies chest pain, dyspnea, palpitations, syncope, edema, melena, hematochezia, or hematemesis. He only reports posterior neck discomfort when he moves his head.    PCP: [facility]

## 2023-08-17 NOTE — ASSESSMENT
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 He underwent Lexiscan nuclear stress test on 6/10/22, which reported a small apical infarct and dilated LV with moderate global hypokinesis, LVEF 34%. Cardiac cath only demonstrated 40-50% prox+mid LAD disease and global hypokinesis with LVEF 30-35%.  Patient is clinically stable from HFrEF standpoint and appears euvolemic.  Will continue Entresto 24 mg/26 mg po bid, metoprolol succinate 25 mg po daily, spironolactone 25 mg (only 3 days per week per patient), and furosemide 40 mg po every other day.  ECG today demonstrated atrial fibrillation, rate 84 bpm, RBBB+LAFB (bifascicular block), PVCs. Will continue metoprolol succinate 25 mg po daily for afib rate control. Given [id number] score = 6, will continue Eliquis 5 mg po bid for afib thromboembolic prophylaxis.  Will continue metoprolol succinate 25 mg, furosemide, spironolactone, and Entresto for BP control.  Patient was advised to go to his primary care clinic for evaluation of his reported musculoskeletal neck pain. Patient likely has cervical disc disease.

## 2023-08-17 NOTE — CARDIOLOGY SUMMARY
[de-identified] : 8/17/23 ECG: Atrial fibrillation, rate 84 bpm, RBBB+LAFB (bifascicular block), PVCs 5/22/23 ECG: Atrial fibrillation, rate 80 bpm, RBBB+LAFB (bifascicular block) [de-identified] : 6/10/22 Lexiscan Myoview (at [place]): No CP or ECG changes. Small apical infarct. Dilated LV with moderate global hypokinesis, LVEF 34%.  [de-identified] : 3/27/23 Echo (at [place]): Moderately reduces systolic function globally, LVEF 35%. Normal RV size and systolic function. Mod biatrial enlargement. Mod AR/MR/TR. Mild pulm HTN with PASP 41 mmHg.  4/22/22 Echo (at [place]): Moderately dilated LV with moderate global hypokinesis, LVEF 33%. Severe biatrial enlargement. Mild AR. Mild to mod MR. Mod TR. Mild pulm HTN. Dilated aortic root (4.2 cm). 4/19/21 Echo: Mildly increased LV size with low normal systolic function, LVEF 50-55%. Grade II DD. Moderately increased LA volume index (42 ml/m2). Mild to mod MR. AV sclerosis. Mild to mod AR. Mild TR. Mild [place]. Mildly dilated aortic root (3.9 cm). 2/19/19 Echo: Normal LV size and systolic function, LVEF 60%. Grade II DD. Mild conc LVH. Severely increased LA volume index. Mild AV sclerosis. Mod AR. Mild MR. Mild TR.  [de-identified] : \par  1/7/23 CTA chest (at [place]): No pulmonary embolism. 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

## 2023-08-17 NOTE — REVIEW OF SYSTEMS
[Lower Ext Edema] : lower extremity edema [FreeTextEntry2] : vertigo [Negative] : Integumentary [SOB] : no shortness of breath [Chest Discomfort] : no chest discomfort [Palpitations] : no palpitations [Syncope] : no syncope [FreeTextEntry9] : neck pain with movement of head

## 2023-11-14 ENCOUNTER — NON-APPOINTMENT (OUTPATIENT)
Age: 83
End: 2023-11-14

## 2023-11-17 ENCOUNTER — NON-APPOINTMENT (OUTPATIENT)
Age: 83
End: 2023-11-17

## 2023-12-14 ENCOUNTER — RESULT REVIEW (OUTPATIENT)
Age: 83
End: 2023-12-14

## 2023-12-17 ENCOUNTER — TRANSCRIPTION ENCOUNTER (OUTPATIENT)
Age: 83
End: 2023-12-17

## 2023-12-19 DIAGNOSIS — Z86.39 PERSONAL HISTORY OF OTHER ENDOCRINE, NUTRITIONAL AND METABOLIC DISEASE: ICD-10-CM

## 2023-12-19 DIAGNOSIS — I45.10 UNSPECIFIED RIGHT BUNDLE-BRANCH BLOCK: ICD-10-CM

## 2023-12-19 DIAGNOSIS — N43.3 HYDROCELE, UNSPECIFIED: ICD-10-CM

## 2023-12-19 DIAGNOSIS — Z80.0 FAMILY HISTORY OF MALIGNANT NEOPLASM OF DIGESTIVE ORGANS: ICD-10-CM

## 2023-12-19 DIAGNOSIS — Z85.038 PERSONAL HISTORY OF OTHER MALIGNANT NEOPLASM OF LARGE INTESTINE: ICD-10-CM

## 2023-12-19 DIAGNOSIS — Z87.09 PERSONAL HISTORY OF OTHER DISEASES OF THE RESPIRATORY SYSTEM: ICD-10-CM

## 2023-12-19 DIAGNOSIS — Z86.79 PERSONAL HISTORY OF OTHER DISEASES OF THE CIRCULATORY SYSTEM: ICD-10-CM

## 2023-12-19 PROBLEM — Z00.00 ENCOUNTER FOR PREVENTIVE HEALTH EXAMINATION: Status: ACTIVE | Noted: 2023-12-19

## 2023-12-19 RX ORDER — ATORVASTATIN CALCIUM 80 MG/1
80 TABLET, FILM COATED ORAL
Refills: 0 | Status: ACTIVE | COMMUNITY

## 2023-12-19 RX ORDER — METOPROLOL SUCCINATE 100 MG/1
TABLET, EXTENDED RELEASE ORAL
Refills: 0 | Status: ACTIVE | COMMUNITY

## 2023-12-19 RX ORDER — SACUBITRIL AND VALSARTAN 49; 51 MG/1; MG/1
TABLET, FILM COATED ORAL
Refills: 0 | Status: ACTIVE | COMMUNITY

## 2023-12-19 RX ORDER — APIXABAN 5 MG/1
TABLET, FILM COATED ORAL
Refills: 0 | Status: ACTIVE | COMMUNITY

## 2023-12-19 RX ORDER — SPIRONOLACTONE 50 MG/1
TABLET ORAL
Refills: 0 | Status: ACTIVE | COMMUNITY

## 2023-12-19 RX ORDER — ALBUTEROL 90 MCG
AEROSOL (GRAM) INHALATION
Refills: 0 | Status: ACTIVE | COMMUNITY

## 2023-12-20 ENCOUNTER — APPOINTMENT (OUTPATIENT)
Dept: COLORECTAL SURGERY | Facility: CLINIC | Age: 83
End: 2023-12-20

## 2023-12-20 ENCOUNTER — APPOINTMENT (OUTPATIENT)
Dept: COLORECTAL SURGERY | Facility: CLINIC | Age: 83
End: 2023-12-20
Payer: MEDICARE

## 2023-12-20 ENCOUNTER — NON-APPOINTMENT (OUTPATIENT)
Age: 83
End: 2023-12-20

## 2023-12-20 VITALS — WEIGHT: 154 LBS | HEIGHT: 74 IN | BODY MASS INDEX: 19.76 KG/M2

## 2023-12-20 PROCEDURE — 99203 OFFICE O/P NEW LOW 30 MIN: CPT

## 2023-12-20 NOTE — PHYSICAL EXAM
[No Rash or Lesion] : No rash or lesion [Alert] : alert [Oriented to Person] : oriented to person [Oriented to Place] : oriented to place [Oriented to Time] : oriented to time [Calm] : calm [de-identified] : Normal [de-identified] : The anterior perianal abscess is almost resolved; mild residuial swelling and tenderness; anterior anal fissure with spasm; CHAR and anoscopy deferred due to pain [de-identified] : Normal [de-identified] : Normal [de-identified] : Normal [de-identified] : Normal [de-identified] : Normal

## 2023-12-20 NOTE — ASSESSMENT
[FreeTextEntry1] : 83 M here for follow up after recent hospitalization for a perianal abscess, which drained spontaneously. He is on oral antibiotics per discharge instructions. On exam, the abscess is resolving well. He has a painful anterior anal fissure with anal spasm. Plan: High fiber diet. Sitz baths. Stool softeners. DTZ and lidocaine cream for local application. See again after 2-3 weeks. All questions answered.

## 2023-12-20 NOTE — HISTORY OF PRESENT ILLNESS
[FreeTextEntry1] : 83-year-old male pt was admitted at [facility] 12/14-17 with anal pain, found to have perianal cellulitis. PMH colorectal cancer s/p resection 15-20 years ago, HTN, HLD, CHF, R BBB, Afib on Eliquis, CVA s/p thrombectomy. Pt wants to hold off on surgery. Was discharged on antibiotics.  CT pelvis 12/14/23 Hydrocele again appreciated, No evidence of perirectal abscess.  Here for follow up. Reports the anterior perianal pain and swelling have improved, but he has a new anal pain, which is worse after passing stool. No blood. On antibiotics per discharge instructions.

## 2023-12-21 ENCOUNTER — APPOINTMENT (OUTPATIENT)
Dept: GERIATRICS | Facility: CLINIC | Age: 83
End: 2023-12-21
Payer: MEDICARE

## 2023-12-21 VITALS
HEIGHT: 74 IN | RESPIRATION RATE: 16 BRPM | SYSTOLIC BLOOD PRESSURE: 147 MMHG | WEIGHT: 149.91 LBS | DIASTOLIC BLOOD PRESSURE: 70 MMHG | BODY MASS INDEX: 19.24 KG/M2 | HEART RATE: 84 BPM | OXYGEN SATURATION: 100 % | TEMPERATURE: 97.3 F

## 2023-12-21 VITALS — DIASTOLIC BLOOD PRESSURE: 80 MMHG | SYSTOLIC BLOOD PRESSURE: 120 MMHG

## 2023-12-21 VITALS
HEIGHT: 74 IN | DIASTOLIC BLOOD PRESSURE: 69 MMHG | OXYGEN SATURATION: 94 % | RESPIRATION RATE: 16 BRPM | SYSTOLIC BLOOD PRESSURE: 138 MMHG | WEIGHT: 149.19 LBS | HEART RATE: 73 BPM | TEMPERATURE: 98.3 F | BODY MASS INDEX: 19.15 KG/M2

## 2023-12-21 DIAGNOSIS — Z85.038 PERSONAL HISTORY OF OTHER MALIGNANT NEOPLASM OF LARGE INTESTINE: ICD-10-CM

## 2023-12-21 DIAGNOSIS — J44.9 CHRONIC OBSTRUCTIVE PULMONARY DISEASE, UNSPECIFIED: ICD-10-CM

## 2023-12-21 DIAGNOSIS — Z51.5 ENCOUNTER FOR PALLIATIVE CARE: ICD-10-CM

## 2023-12-21 DIAGNOSIS — K62.89 OTHER SPECIFIED DISEASES OF ANUS AND RECTUM: ICD-10-CM

## 2023-12-21 PROCEDURE — 99205 OFFICE O/P NEW HI 60 MIN: CPT

## 2023-12-21 RX ORDER — DOCUSATE SODIUM 100 MG/1
100 CAPSULE ORAL TWICE DAILY
Qty: 60 | Refills: 5 | Status: ACTIVE | COMMUNITY
Start: 2023-12-21 | End: 1900-01-01

## 2023-12-21 NOTE — REVIEW OF SYSTEMS
[Palpitations] : palpitations [Arthralgias] : arthralgias [As Noted in HPI] : as noted in HPI [Negative] : Heme/Lymph [Fever] : no fever [Chills] : no chills [Feeling Poorly] : not feeling poorly [Feeling Tired] : not feeling tired [Recent Weight Gain (___ Lbs)] : no recent weight gain [Recent Weight Loss (___ Lbs)] : no recent weight loss

## 2023-12-21 NOTE — ASSESSMENT
[Fall precautions] : fall precautions [Social support] : social support [Living arrangements] : living arrangements [Pain Management] : pain management [Medication Management] : medication management [FreeTextEntry1] : pt is a pleasant 84 y/o M with CHF LVEF of 34%, afib on eliquis, COPD, here to establish care with palliative with recent d/c from hospital for perianal abscess on abx seen by colorectal surgery yesterday pps 60%  pain, from anal fissure continue recs as per SX High fiber diet/Sitz baths rx given for colace pt educated on proper use of DTZ and lidocaine topical creams for application for anal fissure continue abx no narcotics due to OIC  f/u with colorectal sx  advised wash with warm water, no alcohol  f/u with cardiology--pt taking metoprolol prn, discussed to take daily not prn  pain: gabapentin at night for pain, no tramadol ISTOP reviewed  f/u prn, pt here with daughter, advised if pain worsens or medical condition worsens can f/u with palliative for ACD or GOC discussions prn, pt not ready to engage at this time due to pain

## 2023-12-21 NOTE — HISTORY OF PRESENT ILLNESS
[FreeTextEntry1] : pt is an 83M w/ PMHx of Colorectal Cancer s/p resection 15-20 y/ ago, HTN, HLD, CHF with LVEF 34% , R BBB, Afib on Eliquis, CVA s/p thrombectomy recently discharged from [place] for perianal pain found to have perianal abscess being followed by Dr. Espinal seen earlier this week. comes in to establish care with palliative for pain.  spoke to pt in Yoruba, accompanied by his daughter pt reports pain bowel movements, was worse prior to hospitalization, significantly improved but sitll persistent, pain described as a burning, sharp,. pt verbalized understanding of current medical condition, pt reports my only problem "is the pain in my butt, literally"  pt reports occasional pain in b/l sholders take gabapentin with relief  + BM last this am, reports stool can be hard at times.states used alchohol on fissure with severe pain  no other concerns at this time

## 2023-12-21 NOTE — PHYSICAL EXAM
[General Appearance - Alert] : alert [General Appearance - In No Acute Distress] : in no acute distress [General Appearance - Well Nourished] : well nourished [Sclera] : the sclera and conjunctiva were normal [Normal Oral Mucosa] : normal oral mucosa [No Oral Pallor] : no oral pallor [No Oral Cyanosis] : no oral cyanosis [Outer Ear] : the ears and nose were normal in appearance [Hearing Threshold Finger Rub Not St[place]] : hearing was normal [Examination Of The Oral Cavity] : the lips and gums were normal [Neck Appearance] : the appearance of the neck was normal [] : no respiratory distress [Respiration, Rhythm And Depth] : normal respiratory rhythm and effort [Exaggerated Use Of Accessory Muscles For Inspiration] : no accessory muscle use [Auscultation Breath Sounds / Voice Sounds] : lungs were clear to auscultation bilaterally [Heart Rate And Rhythm] : heart rate was normal and rhythm regular [Edema] : there was no peripheral edema [Heart Sounds] : normal S1 and S2 [External Hemorrhoid] : external hemorrhoids [Testes Swelling] : the testicles were not swollen [No CVA Tenderness] : no ~M costovertebral angle tenderness [No Spinal Tenderness] : no spinal tenderness [Abnormal Walk] : normal gait [Skin Color & Pigmentation] : normal skin color and pigmentation [Oriented To Time, Place, And Person] : oriented to person, place, and time [Impaired Insight] : insight and judgment were intact [Affect] : the affect was normal

## 2023-12-27 ENCOUNTER — APPOINTMENT (OUTPATIENT)
Dept: CARDIOLOGY | Facility: CLINIC | Age: 83
End: 2023-12-27
Payer: MEDICARE

## 2023-12-27 ENCOUNTER — NON-APPOINTMENT (OUTPATIENT)
Age: 83
End: 2023-12-27

## 2023-12-27 VITALS
RESPIRATION RATE: 14 BRPM | DIASTOLIC BLOOD PRESSURE: 62 MMHG | BODY MASS INDEX: 19.52 KG/M2 | WEIGHT: 152 LBS | HEART RATE: 76 BPM | SYSTOLIC BLOOD PRESSURE: 124 MMHG | OXYGEN SATURATION: 98 %

## 2023-12-27 DIAGNOSIS — I63.9 CEREBRAL INFARCTION, UNSPECIFIED: ICD-10-CM

## 2023-12-27 PROCEDURE — 36415 COLL VENOUS BLD VENIPUNCTURE: CPT

## 2023-12-27 PROCEDURE — 99214 OFFICE O/P EST MOD 30 MIN: CPT | Mod: 25

## 2023-12-27 PROCEDURE — 93000 ELECTROCARDIOGRAM COMPLETE: CPT

## 2023-12-27 NOTE — HISTORY OF PRESENT ILLNESS
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 He underwent Lexiscan nuclear stress test on 6/10/22, which reported a small apical infarct and dilated LV with moderate global hypokinesis, LVEF 34%. Cardiac cath only demonstrated 40-50% prox+mid LAD disease and global hypokinesis with LVEF 30-35%.  Patient presents today for follow-up. Patient denies chest pain, dyspnea, palpitations, syncope, edema, melena, hematochezia, or hematemesis. He was recently hospitalized at [facility] from 12/14-12/17/23 for [place]-anal cellulitis which improved with antibiotics. No surgical intervention is currently required.   PCP: [facility]

## 2023-12-27 NOTE — CARDIOLOGY SUMMARY
[de-identified] : 12/27/23 ECG: Atrial fibrillation, rate 87 bpm, PVC, RBBB+LAFB (bifascicular block) 8/17/23 ECG: Atrial fibrillation, rate 84 bpm, RBBB+LAFB (bifascicular block), PVCs [de-identified] : 6/10/22 Lexiscan Myoview (at [place]): No CP or ECG changes. Small apical infarct. Dilated LV with moderate global hypokinesis, LVEF 34%.  [de-identified] : 3/27/23 Echo (at [place]): Moderately reduces systolic function globally, LVEF 35%. Normal RV size and systolic function. Mod biatrial enlargement. Mod AR/MR/TR. Mild pulm HTN with PASP 41 mmHg.  4/22/22 Echo (at [place]): Moderately dilated LV with moderate global hypokinesis, LVEF 33%. Severe biatrial enlargement. Mild AR. Mild to mod MR. Mod TR. Mild pulm HTN. Dilated aortic root (4.2 cm). 4/19/21 Echo: Mildly increased LV size with low normal systolic function, LVEF 50-55%. Grade II DD. Moderately increased LA volume index (42 ml/m2). Mild to mod MR. AV sclerosis. Mild to mod AR. Mild TR. Mild MT. Mildly dilated aortic root (3.9 cm). [de-identified] : 1/7/23 CTA chest (at [place]): No pulmonary embolism. 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  [de-identified] : 6/29/22 Cardiac cath (at [facility]): 40-50% prox + mid LAD stenosis (iFR 0.92). Mild luminal irreg in LCx and RCA. LVEF 30-35%, global hypokinesis. Normal LVEDP.

## 2023-12-27 NOTE — REVIEW OF SYSTEMS
[Lower Ext Edema] : lower extremity edema [Negative] : Heme/Lymph [SOB] : no shortness of breath [Chest Discomfort] : no chest discomfort [Palpitations] : no palpitations [Syncope] : no syncope [FreeTextEntry9] : neck pain with movement of head

## 2023-12-27 NOTE — ASSESSMENT
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 He underwent Lexiscan nuclear stress test on 6/10/22, which reported a small apical infarct and dilated LV with moderate global hypokinesis, LVEF 34%. Cardiac cath only demonstrated 40-50% prox+mid LAD disease and global hypokinesis with LVEF 30-35%.  Patient is clinically stable from HFrEF/CAD standpoint and appears euvolemic. Will continue Entresto 24 mg/26 mg po bid, metoprolol succinate 25 mg po daily, spironolactone 25 mg (only 3 days per week per patient), and furosemide 40 mg po every other day. Will check CMP and lipid panel.  ECG today demonstrated atrial fibrillation with RBBB+LAFB (bifascicular block). Will continue metoprolol succinate 25 mg po daily for afib rate control. Given [id number] score = 6, will continue Eliquis 5 mg po bid for afib thromboembolic prophylaxis. Will check CBC.  BP is currently controlled. Will continue metoprolol succinate 25 mg, furosemide, spironolactone, and Entresto.

## 2023-12-28 ENCOUNTER — NON-APPOINTMENT (OUTPATIENT)
Age: 83
End: 2023-12-28

## 2023-12-28 LAB
ALBUMIN SERPL ELPH-MCNC: 3.7 G/DL
ALP BLD-CCNC: 125 U/L
ALT SERPL-CCNC: 33 U/L
ANION GAP SERPL CALC-SCNC: 12 MMOL/L
AST SERPL-CCNC: 38 U/L
BILIRUB SERPL-MCNC: 0.7 MG/DL
BUN SERPL-MCNC: 17 MG/DL
CALCIUM SERPL-MCNC: 9.5 MG/DL
CHLORIDE SERPL-SCNC: 107 MMOL/L
CHOLEST SERPL-MCNC: 116 MG/DL
CO2 SERPL-SCNC: 24 MMOL/L
CREAT SERPL-MCNC: 0.8 MG/DL
EGFR: 87 ML/MIN/1.73M2
GLUCOSE SERPL-MCNC: 88 MG/DL
HCT VFR BLD CALC: 42.5 %
HDLC SERPL-MCNC: 53 MG/DL
HGB BLD-MCNC: 13.6 G/DL
LDLC SERPL CALC-MCNC: 53 MG/DL
MCHC RBC-ENTMCNC: 31.6 PG
MCHC RBC-ENTMCNC: 32 GM/DL
MCV RBC AUTO: 98.8 FL
NONHDLC SERPL-MCNC: 63 MG/DL
PLATELET # BLD AUTO: 161 K/UL
POTASSIUM SERPL-SCNC: 4 MMOL/L
PROT SERPL-MCNC: 6.3 G/DL
RBC # BLD: 4.3 M/UL
RBC # FLD: 15.3 %
SODIUM SERPL-SCNC: 144 MMOL/L
TRIGL SERPL-MCNC: 40 MG/DL
WBC # FLD AUTO: 6.26 K/UL

## 2024-01-03 ENCOUNTER — APPOINTMENT (OUTPATIENT)
Dept: COLORECTAL SURGERY | Facility: CLINIC | Age: 84
End: 2024-01-03
Payer: MEDICARE

## 2024-01-03 VITALS
WEIGHT: 152 LBS | HEART RATE: 101 BPM | BODY MASS INDEX: 19.51 KG/M2 | SYSTOLIC BLOOD PRESSURE: 140 MMHG | HEIGHT: 74 IN | DIASTOLIC BLOOD PRESSURE: 74 MMHG

## 2024-01-03 PROCEDURE — 99214 OFFICE O/P EST MOD 30 MIN: CPT

## 2024-01-03 NOTE — PHYSICAL EXAM
[No Rash or Lesion] : No rash or lesion [Alert] : alert [Oriented to Person] : oriented to person [Oriented to Place] : oriented to place [Oriented to Time] : oriented to time [Calm] : calm [de-identified] : Normal [de-identified] : The anterior perianal abscess has resolved; posterior anal fissure with spasm, with anal skin tag; CHAR and anoscopy deferred due to pain [de-identified] : Normal [de-identified] : Normal [de-identified] : Normal [de-identified] : Normal [de-identified] : Normal

## 2024-01-03 NOTE — ASSESSMENT
[FreeTextEntry1] : 83 M here for follow up. The perianal abscess has resolved. However, he has developed a posterior anal fissure with an anal skin tag. Plan: High fiber diet. Stool softeners. Sitz baths. DTZ ointment and lidocaine ointment for local application. See again after 1 month. All questions answered.

## 2024-01-03 NOTE — HISTORY OF PRESENT ILLNESS
[FreeTextEntry1] : 83-M here for follow up. He has been following with us for a perianal abscess, which spontaneously drained. Current symptoms are rectal pain with bowel movements. Also sees blood on wiping. No fever. He had been admitted at [facility] 12/14-17 with anal pain, found to have perianal cellulitis. PMH colorectal cancer s/p resection 15-20 years ago, HTN, HLD, CHF, R BBB, Afib on Eliquis, CVA s/p thrombectomy.  Cyclosporine Pregnancy And Lactation Text: This medication is Pregnancy Category C and it isn't know if it is safe during pregnancy. This medication is excreted in breast milk.

## 2024-02-01 ENCOUNTER — APPOINTMENT (OUTPATIENT)
Dept: PODIATRY | Facility: CLINIC | Age: 84
End: 2024-02-01
Payer: MEDICARE

## 2024-02-01 VITALS — HEIGHT: 74 IN

## 2024-02-01 DIAGNOSIS — B35.1 TINEA UNGUIUM: ICD-10-CM

## 2024-02-01 DIAGNOSIS — G89.29 PAIN IN RIGHT FOOT: ICD-10-CM

## 2024-02-01 DIAGNOSIS — G89.29 PAIN IN LEFT FOOT: ICD-10-CM

## 2024-02-01 DIAGNOSIS — M79.671 PAIN IN RIGHT FOOT: ICD-10-CM

## 2024-02-01 DIAGNOSIS — M79.672 PAIN IN LEFT FOOT: ICD-10-CM

## 2024-02-01 PROCEDURE — 99213 OFFICE O/P EST LOW 20 MIN: CPT | Mod: 25

## 2024-02-01 PROCEDURE — 11056 PARNG/CUTG B9 HYPRKR LES 2-4: CPT | Mod: Q8

## 2024-02-01 PROCEDURE — 11721 DEBRIDE NAIL 6 OR MORE: CPT | Mod: 59

## 2024-02-01 NOTE — HISTORY OF PRESENT ILLNESS
[FreeTextEntry1] : The patient presents for follow up of chronic and painful mycotic nail disease as well as painful kyperkeratoses. Past professional  tx's in the past in the presence of PAD have consisted of periodic debridements of both nails and painful keratoses which have offered significant relief in controlling of symptoms and the patient presents for follow up care.

## 2024-02-01 NOTE — PROCEDURE
[FreeTextEntry1] : Using sterile instrumentation debridement of all nails manually and electrically to decrease thickness, pain and girth and make shoe gear more comfortable with "slant back" procedure of any bordering spicules causing pain Utilizing a scalpel and sterile aseptic technique sharp debridement of multiple hyperkeratotic lesions performed. Appropriate padding were necessary. I have had a lengthy discussion with the patient regarding overall skincare. The importance of the type of socks, the type of shoes, and the type of overall foot hygiene is important to help control and prevent eruptions especially over extreme weather changes. This included but was not limited to hydration and lubrication, dove soap, triple rinse clothing and linens. I also explained the importance of thorough drying of both feet especially the web spaces. Given the extreme temperatures back in a car I also reviewed the type of shoes that would help reduce the chances of cracking of the skin especially leading to fissuring of the heels. Overall skincare precautions were reviewed education literature dispensed in the patient's questions asked and answered appropriately. A complete and thorough evaluation of the type of shoes they should be wearing and type of shoes for this time of year was discussed with patient. i have dispensed a pair of heel cushions to the patient and advised the patient that accommodative support as well as elevation of both heels to help reduce symptoms

## 2024-02-01 NOTE — PHYSICAL EXAM
[General Appearance - Alert] : alert [General Appearance - In No Acute Distress] : in no acute distress [Ankle Swelling (On Exam)] : not present [Varicose Veins Of Lower Extremities] : bilaterally [Ankle Swelling On The Left] : moderate [] : on both lower extremities [FreeTextEntry3] : The vascular exam reveals decreased pedal pulses bilateral, a capillary fill time of 3-5 seconds,  mild atrophic skin changes, mild varicosities absence of hair growth, but no cyanosis, clubbing or mottling seen. [de-identified] : overall muscle strength testing is decreased, but consistant with the patients age and medical problems. Mild atrophy and overall weakness present. [FreeTextEntry1] : The patient has all contributing factors to onychomycosis including but not limited to thickness, subungual debris, discoloration and partial lysis and they are brittle when cut. Painful hyperkeratotic lesions noted to several lesser digits (3/4 right foot) and left heel [Sensation] : the sensory exam was normal to light touch and pinprick [No Focal Deficits] : no focal deficits [Deep Tendon Reflexes (DTR)] : deep tendon reflexes were 2+ and symmetric [Motor Exam] : the motor exam was normal [Oriented To Time, Place, And Person] : oriented to person, place, and time [Impaired Insight] : insight and judgment were intact [Affect] : the affect was normal

## 2024-02-01 NOTE — REVIEW OF SYSTEMS
[Leg Claudication] : no intermittent leg claudication [Lower Ext Edema] : no extremity edema [Arthralgias] : arthralgias [Limb Pain] : no limb pain [Limb Swelling] : no limb swelling [Skin Lesions] : skin lesion [Skin Wound] : no skin wound [Itching] : no itching [Dry Skin] : dry skin [Negative] : Heme/Lymph

## 2024-02-08 ENCOUNTER — APPOINTMENT (OUTPATIENT)
Dept: CARDIOLOGY | Facility: CLINIC | Age: 84
End: 2024-02-08
Payer: MEDICARE

## 2024-02-08 VITALS
HEIGHT: 74 IN | HEART RATE: 82 BPM | DIASTOLIC BLOOD PRESSURE: 74 MMHG | SYSTOLIC BLOOD PRESSURE: 143 MMHG | RESPIRATION RATE: 14 BRPM | OXYGEN SATURATION: 97 % | BODY MASS INDEX: 20.28 KG/M2 | WEIGHT: 158 LBS

## 2024-02-08 DIAGNOSIS — I25.10 ATHEROSCLEROTIC HEART DISEASE OF NATIVE CORONARY ARTERY W/OUT ANGINA PECTORIS: ICD-10-CM

## 2024-02-08 DIAGNOSIS — I10 ESSENTIAL (PRIMARY) HYPERTENSION: ICD-10-CM

## 2024-02-08 DIAGNOSIS — I50.20 UNSPECIFIED SYSTOLIC (CONGESTIVE) HEART FAILURE: ICD-10-CM

## 2024-02-08 DIAGNOSIS — I48.91 UNSPECIFIED ATRIAL FIBRILLATION: ICD-10-CM

## 2024-02-08 PROCEDURE — 99214 OFFICE O/P EST MOD 30 MIN: CPT | Mod: 25

## 2024-02-08 PROCEDURE — 36415 COLL VENOUS BLD VENIPUNCTURE: CPT

## 2024-02-08 NOTE — REVIEW OF SYSTEMS
Detail Level: Detailed [Palpitations] : palpitations [Shortness Of Breath] : shortness of breath [Cough] : cough [Wheezing] : wheezing [SOB on Exertion] : shortness of breath during exertion [Negative] : Endocrine [Fever] : no fever [Feeling Tired] : not feeling tired [Feeling Poorly] : not feeling poorly [Chest Pain] : no chest pain [Orthopnea] : no orthopnea [PND] : no PND [Abdominal Pain] : no abdominal pain [Vomiting] : no vomiting [Constipation] : no constipation [Diarrhea] : no diarrhea [Melena] : no melena

## 2024-02-09 PROBLEM — I10 ESSENTIAL (PRIMARY) HYPERTENSION: Status: ACTIVE | Noted: 2019-03-19

## 2024-02-09 PROBLEM — I25.10 CAD (CORONARY ARTERY DISEASE): Status: ACTIVE | Noted: 2023-12-27

## 2024-02-09 NOTE — REVIEW OF SYSTEMS
[SOB] : no shortness of breath [Chest Discomfort] : no chest discomfort [Lower Ext Edema] : lower extremity edema [Palpitations] : no palpitations [Syncope] : no syncope [Joint Pain] : joint pain [Shoulder Pain] : shoulder pain [Negative] : Heme/Lymph [FreeTextEntry9] : neck pain with movement of head

## 2024-02-09 NOTE — PHYSICAL EXAM
[Well Developed] : well developed [Well Nourished] : well nourished [No Acute Distress] : no acute distress [Normal Conjunctiva] : normal conjunctiva [Normal Venous Pressure] : normal venous pressure [No Carotid Bruit] : no carotid bruit [Normal Rate] : normal [Irregularly Irregular] : irregularly irregular [Normal S1] : normal S1 [Normal S2] : normal S2 [S3] : no S3 [S4] : no S4 [No Pitting Edema] : no pitting edema present [No Murmur] : no murmurs heard [Right Carotid Bruit] : no bruit heard over the right carotid [Left Carotid Bruit] : no bruit heard over the left carotid [2+] : right 2+ [Clear Lung Fields] : clear lung fields [Good Air Entry] : good air entry [No Respiratory Distress] : no respiratory distress  [No Edema] : no edema [No Cyanosis] : no cyanosis [Moves all extremities] : moves all extremities [No Clubbing] : no clubbing [No Focal Deficits] : no focal deficits [Normal Speech] : normal speech [Alert and Oriented] : alert and oriented [Normal memory] : normal memory

## 2024-02-09 NOTE — CARDIOLOGY SUMMARY
[de-identified] : 12/27/23 ECG: Atrial fibrillation, rate 87 bpm, PVC, RBBB+LAFB (bifascicular block) 8/17/23 ECG: Atrial fibrillation, rate 84 bpm, RBBB+LAFB (bifascicular block), PVCs [de-identified] : 3/27/23 Echo (at [place]): Moderately reduces systolic function globally, LVEF 35%. Normal RV size and systolic function. Mod biatrial enlargement. Mod AR/MR/TR. Mild pulm HTN with PASP 41 mmHg.  4/22/22 Echo (at [place]): Moderately dilated LV with moderate global hypokinesis, LVEF 33%. Severe biatrial enlargement. Mild AR. Mild to mod MR. Mod TR. Mild pulm HTN. Dilated aortic root (4.2 cm). 4/19/21 Echo: Mildly increased LV size with low normal systolic function, LVEF 50-55%. Grade II DD. Moderately increased LA volume index (42 ml/m2). Mild to mod MR. AV sclerosis. Mild to mod AR. Mild TR. Mild MA. Mildly dilated aortic root (3.9 cm). [de-identified] : 6/10/22 Lexiscan Myoview (at [place]): No CP or ECG changes. Small apical infarct. Dilated LV with moderate global hypokinesis, LVEF 34%.  [de-identified] : 6/29/22 Cardiac cath (at [facility]): 40-50% prox + mid LAD stenosis (iFR 0.92). Mild luminal irreg in LCx and RCA. LVEF 30-35%, global hypokinesis. Normal LVEDP. [de-identified] : 1/7/23 CTA chest (at [place]): No pulmonary embolism. 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

## 2024-02-09 NOTE — HISTORY OF PRESENT ILLNESS
[FreeTextEntry1] : 82 yo male with hypertension, atrial flutter/fibrillation (on Eliquis), CVA in early April 2022 s/p thrombectomy at [facility], and systolic heart failure per 4/22/22 echocardiogram, which reported reported moderately dilated LV with moderate global hypokinesis & LVEF 33%, which was new compared to 4/2021 echo. He underwent Lexiscan nuclear stress test on 6/10/22, which reported a small apical infarct and dilated LV with moderate global hypokinesis, LVEF 34%. Cardiac cath only demonstrated 40-50% prox+mid LAD disease and global hypokinesis with LVEF 30-35%.  Patient presents today for follow-up. Patient denies chest pain, dyspnea, syncope, edema, melena, hematochezia, or hematemesis. He only complains of palpitations when walking and occasional muscle cramps in his neck and shoulders.   PCP: [facility]

## 2024-02-09 NOTE — ASSESSMENT
[FreeTextEntry1] : 82 yo male with hypertension, atrial flutter/fibrillation (on Eliquis), CVA in early April 2022 s/p thrombectomy at [facility], and systolic heart failure per 4/22/22 echocardiogram, which reported reported moderately dilated LV with moderate global hypokinesis & LVEF 33%, which was new compared to 4/2021 echo. He underwent Lexiscan nuclear stress test on 6/10/22, which reported a small apical infarct and dilated LV with moderate global hypokinesis, LVEF 34%. Cardiac cath only demonstrated 40-50% prox+mid LAD disease and global hypokinesis with LVEF 30-35%.  Patient is clinically stable from HFrEF/CAD standpoint and appears euvolemic. Will continue Entresto 24 mg/26 mg po bid, metoprolol succinate 25 mg po daily, spironolactone 25 mg (only 3 days per week per patient), and furosemide 40 mg po every other day. Will check CMP and Mg levels.  ECG today demonstrated atrial fibrillation with RBBB+LAFB (bifascicular block). Will continue metoprolol succinate 25 mg po daily for afib rate control. Given [id number] score = 6, will continue Eliquis 5 mg po bid for afib thromboembolic prophylaxis. Will check CBC.  BP is currently controlled. Will continue metoprolol succinate 25 mg, furosemide, spironolactone, and Entresto.

## 2024-02-12 LAB
ALBUMIN SERPL ELPH-MCNC: 4.7 G/DL
ALP BLD-CCNC: 119 U/L
ALT SERPL-CCNC: 19 U/L
ANION GAP SERPL CALC-SCNC: 14 MMOL/L
AST SERPL-CCNC: 28 U/L
BILIRUB SERPL-MCNC: 1.9 MG/DL
BUN SERPL-MCNC: 23 MG/DL
CALCIUM SERPL-MCNC: 10.1 MG/DL
CHLORIDE SERPL-SCNC: 98 MMOL/L
CO2 SERPL-SCNC: 27 MMOL/L
CREAT SERPL-MCNC: 0.97 MG/DL
EGFR: 76 ML/MIN/1.73M2
GLUCOSE SERPL-MCNC: 101 MG/DL
HCT VFR BLD CALC: 44.4 %
HGB BLD-MCNC: 14.4 G/DL
MAGNESIUM SERPL-MCNC: 1.9 MG/DL
MCHC RBC-ENTMCNC: 32.4 GM/DL
MCHC RBC-ENTMCNC: 32.6 PG
MCV RBC AUTO: 100.5 FL
PLATELET # BLD AUTO: 181 K/UL
POTASSIUM SERPL-SCNC: 4 MMOL/L
PROT SERPL-MCNC: 7.2 G/DL
RBC # BLD: 4.42 M/UL
RBC # FLD: 15 %
SODIUM SERPL-SCNC: 139 MMOL/L
WBC # FLD AUTO: 8.22 K/UL

## 2024-02-15 ENCOUNTER — APPOINTMENT (OUTPATIENT)
Dept: CARDIOLOGY | Facility: CLINIC | Age: 84
End: 2024-02-15

## 2024-04-01 ENCOUNTER — RX RENEWAL (OUTPATIENT)
Age: 84
End: 2024-04-01

## 2024-04-01 RX ORDER — LIDOCAINE 5 G/100G
5 OINTMENT TOPICAL
Qty: 50 | Refills: 2 | Status: ACTIVE | COMMUNITY
Start: 2023-03-31 | End: 1900-01-01

## 2024-04-25 ENCOUNTER — APPOINTMENT (OUTPATIENT)
Dept: PODIATRY | Facility: CLINIC | Age: 84
End: 2024-04-25
Payer: MEDICARE

## 2024-04-25 DIAGNOSIS — I70.91 GENERALIZED ATHEROSCLEROSIS: ICD-10-CM

## 2024-04-25 PROCEDURE — 99213 OFFICE O/P EST LOW 20 MIN: CPT | Mod: 25

## 2024-04-25 PROCEDURE — 97597 DBRDMT OPN WND 1ST 20 CM/<: CPT | Mod: RT

## 2024-04-25 PROCEDURE — 11055 PARING/CUTG B9 HYPRKER LES 1: CPT | Mod: 59

## 2024-04-25 NOTE — PROCEDURE
[FreeTextEntry1] : Utilizing sharp instrumentation in the form of a sterile scalpel iris scissor and forcep debridement of devitalized tissue was achieved saline irrigation performed and Bactroban applied. Discharge instructions reviewed all questions asked and answered appropriately Utilizing a scalpel and sterile aseptic technique sharp d hyperkeratotic lesions performed. Appropriate padding were necessary. I have had a lengthy discussion with the patient regarding overall skincare. The importance of the type of socks, the type of shoes, and the type of overall foot hygiene is important to help control and prevent eruptions especially over extreme weather changes. This included but was not limited to hydration and lubrication, dove soap, triple rinse clothing and linens. I also explained the importance of thorough drying of both feet especially the web spaces. Given the extreme temperatures back in a car I also reviewed the type of shoes that would help reduce the chances of cracking of the skin especially leading to fissuring of the heels. Overall skincare precautions were reviewed education literature dispensed in the patient's questions asked and answered appropriately. A complete and thorough evaluation of the type of shoes they should be wearing and type of shoes for this time of year was discussed with patient. i have dispensed a pair of heel cushions to the patient and advised the patient that accommodative support as well as elevation of both heels to help reduce symptoms

## 2024-04-25 NOTE — REVIEW OF SYSTEMS
[Leg Claudication] : no intermittent leg claudication [Lower Ext Edema] : no extremity edema [Skin Lesions] : skin lesion [Skin Wound] : skin wound [Negative] : Musculoskeletal

## 2024-04-25 NOTE — HISTORY OF PRESENT ILLNESS
[FreeTextEntry1] : The patient presents for follow up of chronic painful kyperkeratoses. Past professional  tx's in the past in the presence of PAD have consisted of periodic debridements of both nails and painful keratoses which have offered significant relief in controlling of symptoms and the patient presents for follow up care.

## 2024-04-25 NOTE — PHYSICAL EXAM
[Ankle Swelling (On Exam)] : not present [Varicose Veins Of Lower Extremities] : bilaterally [Ankle Swelling On The Left] : moderate [] : on both lower extremities [FreeTextEntry3] : The vascular exam reveals decreased pedal pulses bilateral, a capillary fill time of 3-5 seconds,  mild atrophic skin changes, mild varicosities absence of hair growth, but no cyanosis, clubbing or mottling seen. [de-identified] : overall muscle strength testing is decreased, but consistant with the patients age and medical problems. Mild atrophy and overall weakness present. [FreeTextEntry1] : The patient has been self treating a distal hyperkeratotic lesion to the tip of the right third toe which is now progressed due to application of over-the-counter acid  painful hyperkeratotic lesion to the plantar aspect of the left heel.

## 2024-05-02 ENCOUNTER — APPOINTMENT (OUTPATIENT)
Dept: PODIATRY | Facility: CLINIC | Age: 84
End: 2024-05-02
Payer: MEDICARE

## 2024-05-02 VITALS
TEMPERATURE: 96.8 F | BODY MASS INDEX: 20.28 KG/M2 | WEIGHT: 158 LBS | SYSTOLIC BLOOD PRESSURE: 130 MMHG | HEIGHT: 74 IN | DIASTOLIC BLOOD PRESSURE: 75 MMHG

## 2024-05-02 DIAGNOSIS — M20.41 OTHER HAMMER TOE(S) (ACQUIRED), RIGHT FOOT: ICD-10-CM

## 2024-05-02 DIAGNOSIS — L85.1 ACQUIRED KERATOSIS [KERATODERMA] PALMARIS ET PLANTARIS: ICD-10-CM

## 2024-05-02 DIAGNOSIS — L97.511 NON-PRESSURE CHRONIC ULCER OF OTHER PART OF RIGHT FOOT LIMITED TO BREAKDOWN OF SKIN: ICD-10-CM

## 2024-05-02 PROCEDURE — 99213 OFFICE O/P EST LOW 20 MIN: CPT

## 2024-05-02 PROCEDURE — G2211 COMPLEX E/M VISIT ADD ON: CPT

## 2024-05-02 NOTE — PROCEDURE
[FreeTextEntry1] : I have had a lengthy discussion with the patient regarding overall skincare. The importance of the type of socks, the type of shoes, and the type of overall foot hygiene is important to help control and prevent eruptions especially over extreme weather changes. This included but was not limited to hydration and lubrication, dove soap, triple rinse clothing and linens. I also explained the importance of thorough drying of both feet especially the web spaces. Given the extreme temperatures back in a car I also reviewed the type of shoes that would help reduce the chances of cracking of the skin especially leading to fissuring of the heels. Overall skincare precautions were reviewed education literature dispensed in the patient's questions asked and answered appropriately. I lengthy discussion with the patient today regarding hygiene and foot health. My discussion to focus mainly on prevention of pathology and remaining proactive. My discussion included but was not limited to overall foot health, foot hygiene, the risks of walking barefoot especially in public places and the need to be conscious of the cleanliness of facilities where not only in a walk barefoot but other people walk barefoot as well. All questions were asked and answered appropriately and educational literature was dispensed I have applied offloading pads to the patient's foot and have given them several samples to continue to use. I have also advised the patient that they can certainly purchase these from an outside vendor and if they are willing to do that and the name and number was supplied

## 2024-05-02 NOTE — PHYSICAL EXAM
[Ankle Swelling (On Exam)] : not present [Varicose Veins Of Lower Extremities] : bilaterally [Ankle Swelling On The Left] : moderate [FreeTextEntry3] : The vascular exam reveals decreased pedal pulses bilateral, a capillary fill time of 3-5 seconds,  mild atrophic skin changes, mild varicosities absence of hair growth, but no cyanosis, clubbing or mottling seen. 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

## 2024-09-29 ENCOUNTER — NON-APPOINTMENT (OUTPATIENT)
Age: 84
End: 2024-09-29

## 2024-09-30 ENCOUNTER — APPOINTMENT (OUTPATIENT)
Dept: CARDIOLOGY | Facility: CLINIC | Age: 84
End: 2024-09-30
Payer: MEDICARE

## 2024-09-30 VITALS
HEIGHT: 74 IN | OXYGEN SATURATION: 97 % | RESPIRATION RATE: 14 BRPM | BODY MASS INDEX: 20.53 KG/M2 | HEART RATE: 74 BPM | DIASTOLIC BLOOD PRESSURE: 64 MMHG | SYSTOLIC BLOOD PRESSURE: 150 MMHG | WEIGHT: 160 LBS

## 2024-09-30 DIAGNOSIS — I48.91 UNSPECIFIED ATRIAL FIBRILLATION: ICD-10-CM

## 2024-09-30 DIAGNOSIS — I10 ESSENTIAL (PRIMARY) HYPERTENSION: ICD-10-CM

## 2024-09-30 DIAGNOSIS — I25.10 ATHEROSCLEROTIC HEART DISEASE OF NATIVE CORONARY ARTERY W/OUT ANGINA PECTORIS: ICD-10-CM

## 2024-09-30 DIAGNOSIS — Z01.810 ENCOUNTER FOR PREPROCEDURAL CARDIOVASCULAR EXAMINATION: ICD-10-CM

## 2024-09-30 DIAGNOSIS — I50.20 UNSPECIFIED SYSTOLIC (CONGESTIVE) HEART FAILURE: ICD-10-CM

## 2024-09-30 PROCEDURE — 93000 ELECTROCARDIOGRAM COMPLETE: CPT | Mod: NC

## 2024-09-30 PROCEDURE — 99214 OFFICE O/P EST MOD 30 MIN: CPT | Mod: 25

## 2024-09-30 NOTE — ASSESSMENT
[FreeTextEntry1] : 85 yo male with hypertension, atrial flutter/fibrillation (on Eliquis), CVA in early April 2022 s/p thrombectomy at [facility], and systolic heart failure per 4/22/22 echocardiogram, which reported reported moderately dilated LV with moderate global hypokinesis & LVEF 33%, which was new compared to 4/2021 echo. He underwent Lexiscan nuclear stress test on 6/10/22, which reported a small apical infarct and dilated LV with moderate global hypokinesis, LVEF 34%. Cardiac cath only demonstrated 40-50% prox+mid LAD disease and global hypokinesis with LVEF 30-35%.  Patient does not have any cardiac contraindications to his pending right reverse total shoulder arthroplasty with Dr. Uday Chavez on 10/11/24 (ph: [phone], fax: [phone]).  Patient is medically optimized from cardiac standpoint. Patient may proceed with acceptable cardiac risk and without further cardiac work-up. Patient should hold his Eliquis 3 days prior to his surgery date and resume it post-op when safe from bleeding risk standpoint as per surgeon.  Patient is clinically stable from HFrEF/CAD standpoint and appears euvolemic. Will continue Entresto 24 mg/26 mg po bid, metoprolol succinate 25 mg po daily, spironolactone 25 mg (only 3 days per week per patient), and furosemide 40 mg po every other day. Will order echocardiogram to reassess LVEF on current medical therapy.  ECG today demonstrated atrial fibrillation with RBBB+LAFB (bifascicular block). Will continue metoprolol succinate 25 mg po daily for afib rate control. Given [id number] score = 6, will continue Eliquis 5 mg po bid for afib thromboembolic prophylaxis.   BP is currently controlled. Will continue metoprolol succinate 25 mg, furosemide, spironolactone, and Entresto.

## 2024-09-30 NOTE — CARDIOLOGY SUMMARY
[de-identified] : 9/30/24 ECG: Atrial fibrillation, rate 77 bpm, PVCs, RBBB, arm lead reversal is noted when compared to 12/27/23 ECG 12/27/23 ECG: Atrial fibrillation, rate 87 bpm, PVC, RBBB+LAFB (bi-fascicular block) [de-identified] : 6/10/22 Lexiscan Myoview (at [place]): No CP or ECG changes. Small apical infarct. Dilated LV with moderate global hypokinesis, LVEF 34%.  [de-identified] : 3/27/23 Echo (at [place]): Moderately reduces systolic function globally, LVEF 35%. Normal RV size and systolic function. Mod biatrial enlargement. Mod AR/MR/TR. Mild pulm HTN with PASP 41 mmHg.  4/22/22 Echo (at [place]): Moderately dilated LV with moderate global hypokinesis, LVEF 33%. Severe biatrial enlargement. Mild AR. Mild to mod MR. Mod TR. Mild pulm HTN. Dilated aortic root (4.2 cm). 4/19/21 Echo: Mildly increased LV size with low normal systolic function, LVEF 50-55%. Grade II DD. Moderately increased LA volume index (42 ml/m2). Mild to mod MR. AV sclerosis. Mild to mod AR. Mild TR. Mild HI. Mildly dilated aortic root (3.9 cm). [de-identified] : 1/7/23 CTA chest (at [place]): No pulmonary embolism. 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  [de-identified] : 6/29/22 Cardiac cath (at [facility]): 40-50% prox + mid LAD stenosis (iFR 0.92). Mild luminal irreg in LCx and RCA. LVEF 30-35%, global hypokinesis. Normal LVEDP.

## 2024-09-30 NOTE — ASSESSMENT
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 He underwent Lexiscan nuclear stress test on 6/10/22, which reported a small apical infarct and dilated LV with moderate global hypokinesis, LVEF 34%. Cardiac cath only demonstrated 40-50% prox+mid LAD disease and global hypokinesis with LVEF 30-35%.  Patient does not have any cardiac contraindications to his pending right reverse total shoulder arthroplasty with Dr. Uday Chavez on 10/11/24 (ph: [phone], fax: [phone]).  Patient is medically optimized from cardiac standpoint. Patient may proceed with acceptable cardiac risk and without further cardiac work-up. Patient should hold his Eliquis 3 days prior to his surgery date and resume it post-op when safe from bleeding risk standpoint as per surgeon.  Patient is clinically stable from HFrEF/CAD standpoint and appears euvolemic. Will continue Entresto 24 mg/26 mg po bid, metoprolol succinate 25 mg po daily, spironolactone 25 mg (only 3 days per week per patient), and furosemide 40 mg po every other day. Will order echocardiogram to reassess LVEF on current medical therapy.  ECG today demonstrated atrial fibrillation with RBBB+LAFB (bifascicular block). Will continue metoprolol succinate 25 mg po daily for afib rate control. Given [id number] score = 6, will continue Eliquis 5 mg po bid for afib thromboembolic prophylaxis.   BP is currently controlled. Will continue metoprolol succinate 25 mg, furosemide, spironolactone, and Entresto.

## 2024-09-30 NOTE — DISCUSSION/SUMMARY
[___ Month(s)] : in [unfilled] month(s) [Optimized for Surgery] : the patient is optimized for surgery [As per surgery] : as per surgery

## 2024-09-30 NOTE — HISTORY OF PRESENT ILLNESS
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 He underwent Lexiscan nuclear stress test on 6/10/22, which reported a small apical infarct and dilated LV with moderate global hypokinesis, LVEF 34%. Cardiac cath only demonstrated 40-50% prox+mid LAD disease and global hypokinesis with LVEF 30-35%.  Patient presents today for pre-operative cardiac evaluation for pending right reverse total shoulder arthroplasty with Dr. Uday Chavez on 10/11/24 (ph: [phone], fax: [phone]). Patient denies chest pain, dyspnea, syncope, edema, melena, hematochezia, or hematemesis. He is able to go up several flights of stairs without complaints. He only complains of right shoulder pain.    Primary: Florentin Rouse ([place] [facility])

## 2024-09-30 NOTE — REVIEW OF SYSTEMS
[Lower Ext Edema] : lower extremity edema [Joint Pain] : joint pain [Shoulder Pain] : shoulder pain [Negative] : Heme/Lymph [SOB] : no shortness of breath [Chest Discomfort] : no chest discomfort [Palpitations] : no palpitations [Syncope] : no syncope [FreeTextEntry9] : neck pain with movement of head

## 2024-09-30 NOTE — CARDIOLOGY SUMMARY
[de-identified] : 9/30/24 ECG: Atrial fibrillation, rate 77 bpm, PVCs, RBBB, arm lead reversal is noted when compared to 12/27/23 ECG 12/27/23 ECG: Atrial fibrillation, rate 87 bpm, PVC, RBBB+LAFB (bi-fascicular block) [de-identified] : 6/10/22 Lexiscan Myoview (at [place]): No CP or ECG changes. Small apical infarct. Dilated LV with moderate global hypokinesis, LVEF 34%.  [de-identified] : 3/27/23 Echo (at [place]): Moderately reduces systolic function globally, LVEF 35%. Normal RV size and systolic function. Mod biatrial enlargement. Mod AR/MR/TR. Mild pulm HTN with PASP 41 mmHg.  4/22/22 Echo (at [place]): Moderately dilated LV with moderate global hypokinesis, LVEF 33%. Severe biatrial enlargement. Mild AR. Mild to mod MR. Mod TR. Mild pulm HTN. Dilated aortic root (4.2 cm). 4/19/21 Echo: Mildly increased LV size with low normal systolic function, LVEF 50-55%. Grade II DD. Moderately increased LA volume index (42 ml/m2). Mild to mod MR. AV sclerosis. Mild to mod AR. Mild TR. Mild AR. Mildly dilated aortic root (3.9 cm). [de-identified] : 1/7/23 CTA chest (at [place]): No pulmonary embolism. 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  [de-identified] : 6/29/22 Cardiac cath (at [facility]): 40-50% prox + mid LAD stenosis (iFR 0.92). Mild luminal irreg in LCx and RCA. LVEF 30-35%, global hypokinesis. Normal LVEDP.

## 2024-09-30 NOTE — HISTORY OF PRESENT ILLNESS
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 He underwent Lexiscan nuclear stress test on 6/10/22, which reported a small apical infarct and dilated LV with moderate global hypokinesis, LVEF 34%. Cardiac cath only demonstrated 40-50% prox+mid LAD disease and global hypokinesis with LVEF 30-35%.  Patient presents today for pre-operative cardiac evaluation for pending right reverse total shoulder arthroplasty with Dr. Uday Chvaez on 10/11/24 (ph: [phone], fax: [phone]). Patient denies chest pain, dyspnea, syncope, edema, melena, hematochezia, or hematemesis. He is able to go up several flights of stairs without complaints. He only complains of right shoulder pain.    Primary: Florentin Rouse ([place] [facility])

## 2024-10-01 ENCOUNTER — NON-APPOINTMENT (OUTPATIENT)
Age: 84
End: 2024-10-01

## 2024-11-14 ENCOUNTER — APPOINTMENT (OUTPATIENT)
Dept: CARDIOLOGY | Facility: CLINIC | Age: 84
End: 2024-11-14
Payer: MEDICARE

## 2024-11-14 VITALS
SYSTOLIC BLOOD PRESSURE: 143 MMHG | OXYGEN SATURATION: 98 % | DIASTOLIC BLOOD PRESSURE: 70 MMHG | HEART RATE: 98 BPM | BODY MASS INDEX: 20.93 KG/M2 | WEIGHT: 163 LBS

## 2024-11-14 DIAGNOSIS — I25.10 ATHEROSCLEROTIC HEART DISEASE OF NATIVE CORONARY ARTERY W/OUT ANGINA PECTORIS: ICD-10-CM

## 2024-11-14 DIAGNOSIS — I10 ESSENTIAL (PRIMARY) HYPERTENSION: ICD-10-CM

## 2024-11-14 DIAGNOSIS — I48.91 UNSPECIFIED ATRIAL FIBRILLATION: ICD-10-CM

## 2024-11-14 DIAGNOSIS — I50.20 UNSPECIFIED SYSTOLIC (CONGESTIVE) HEART FAILURE: ICD-10-CM

## 2024-11-14 PROCEDURE — 99214 OFFICE O/P EST MOD 30 MIN: CPT

## 2024-12-05 ENCOUNTER — APPOINTMENT (OUTPATIENT)
Dept: CARDIOLOGY | Facility: CLINIC | Age: 84
End: 2024-12-05
Payer: MEDICARE

## 2024-12-05 DIAGNOSIS — I50.20 UNSPECIFIED SYSTOLIC (CONGESTIVE) HEART FAILURE: ICD-10-CM

## 2024-12-05 PROCEDURE — 93306 TTE W/DOPPLER COMPLETE: CPT

## 2024-12-25 PROBLEM — F10.90 ALCOHOL USE: Status: ACTIVE | Noted: 2019-02-08

## 2025-03-11 ENCOUNTER — APPOINTMENT (OUTPATIENT)
Dept: PODIATRY | Facility: CLINIC | Age: 85
End: 2025-03-11
Payer: MEDICARE

## 2025-03-11 PROCEDURE — 99213 OFFICE O/P EST LOW 20 MIN: CPT | Mod: 25

## 2025-03-11 PROCEDURE — 11730 AVULSION NAIL PLATE SIMPLE 1: CPT

## 2025-03-11 RX ORDER — CEPHALEXIN 500 MG/1
500 CAPSULE ORAL 3 TIMES DAILY
Qty: 21 | Refills: 1 | Status: ACTIVE | COMMUNITY
Start: 2025-03-11 | End: 1900-01-01

## 2025-03-11 RX ORDER — SILVER SULFADIAZINE 10 MG/G
1 CREAM TOPICAL TWICE DAILY
Qty: 1 | Refills: 1 | Status: ACTIVE | COMMUNITY
Start: 2025-03-11 | End: 1900-01-01

## 2025-03-18 ENCOUNTER — APPOINTMENT (OUTPATIENT)
Dept: PODIATRY | Facility: CLINIC | Age: 85
End: 2025-03-18
Payer: MEDICARE

## 2025-03-18 DIAGNOSIS — L03.032 CELLULITIS OF LEFT TOE: ICD-10-CM

## 2025-03-18 PROCEDURE — 99213 OFFICE O/P EST LOW 20 MIN: CPT

## 2025-03-31 ENCOUNTER — APPOINTMENT (OUTPATIENT)
Dept: GASTROENTEROLOGY | Facility: HOSPITAL | Age: 85
End: 2025-03-31

## 2025-06-25 ENCOUNTER — NON-APPOINTMENT (OUTPATIENT)
Age: 85
End: 2025-06-25

## 2025-06-27 ENCOUNTER — APPOINTMENT (OUTPATIENT)
Dept: GASTROENTEROLOGY | Facility: CLINIC | Age: 85
End: 2025-06-27

## 2025-07-24 ENCOUNTER — RESULT REVIEW (OUTPATIENT)
Age: 85
End: 2025-07-24

## 2025-07-24 ENCOUNTER — APPOINTMENT (OUTPATIENT)
Dept: HEMATOLOGY ONCOLOGY | Facility: CLINIC | Age: 85
End: 2025-07-24
Payer: MEDICARE

## 2025-07-24 VITALS
DIASTOLIC BLOOD PRESSURE: 60 MMHG | RESPIRATION RATE: 16 BRPM | SYSTOLIC BLOOD PRESSURE: 131 MMHG | HEIGHT: 74 IN | BODY MASS INDEX: 19.76 KG/M2 | HEART RATE: 78 BPM | OXYGEN SATURATION: 98 % | WEIGHT: 154 LBS | TEMPERATURE: 97.9 F

## 2025-07-24 DIAGNOSIS — D69.6 THROMBOCYTOPENIA, UNSPECIFIED: ICD-10-CM

## 2025-07-24 DIAGNOSIS — I48.91 UNSPECIFIED ATRIAL FIBRILLATION: ICD-10-CM

## 2025-07-24 DIAGNOSIS — T14.8XXA OTHER INJURY OF UNSPECIFIED BODY REGION, INITIAL ENCOUNTER: ICD-10-CM

## 2025-07-24 DIAGNOSIS — D64.9 ANEMIA, UNSPECIFIED: ICD-10-CM

## 2025-07-24 DIAGNOSIS — Z85.038 PERSONAL HISTORY OF OTHER MALIGNANT NEOPLASM OF LARGE INTESTINE: ICD-10-CM

## 2025-07-24 PROCEDURE — 99205 OFFICE O/P NEW HI 60 MIN: CPT | Mod: 25

## 2025-08-14 ENCOUNTER — APPOINTMENT (OUTPATIENT)
Dept: HEMATOLOGY ONCOLOGY | Facility: CLINIC | Age: 85
End: 2025-08-14

## 2025-08-14 DIAGNOSIS — D72.820 LYMPHOCYTOSIS (SYMPTOMATIC): ICD-10-CM

## 2025-08-14 DIAGNOSIS — E53.8 DEFICIENCY OF OTHER SPECIFIED B GROUP VITAMINS: ICD-10-CM

## 2025-08-14 PROCEDURE — 99215 OFFICE O/P EST HI 40 MIN: CPT | Mod: 2W

## 2025-08-14 RX ORDER — CALCIUM CARBONATE 300MG(750)
1000 TABLET,CHEWABLE ORAL DAILY
Qty: 90 | Refills: 3 | Status: ACTIVE | COMMUNITY
Start: 2025-08-14 | End: 1900-01-01

## 2025-08-18 ENCOUNTER — NON-APPOINTMENT (OUTPATIENT)
Age: 85
End: 2025-08-18

## 2025-08-18 ENCOUNTER — APPOINTMENT (OUTPATIENT)
Dept: VASCULAR SURGERY | Facility: CLINIC | Age: 85
End: 2025-08-18
Payer: MEDICARE

## 2025-08-18 VITALS
DIASTOLIC BLOOD PRESSURE: 85 MMHG | HEART RATE: 75 BPM | HEIGHT: 74 IN | WEIGHT: 154 LBS | BODY MASS INDEX: 19.76 KG/M2 | OXYGEN SATURATION: 98 % | SYSTOLIC BLOOD PRESSURE: 135 MMHG

## 2025-08-18 DIAGNOSIS — I83.93 ASYMPTOMATIC VARICOSE VEINS OF BILATERAL LOWER EXTREMITIES: ICD-10-CM

## 2025-08-18 PROCEDURE — 99202 OFFICE O/P NEW SF 15 MIN: CPT

## 2025-08-20 ENCOUNTER — NON-APPOINTMENT (OUTPATIENT)
Age: 85
End: 2025-08-20

## 2025-08-25 DIAGNOSIS — G89.29 PAIN IN LEFT FOOT: ICD-10-CM

## 2025-08-25 DIAGNOSIS — M79.672 PAIN IN LEFT FOOT: ICD-10-CM

## 2025-08-26 ENCOUNTER — APPOINTMENT (OUTPATIENT)
Dept: PODIATRY | Facility: CLINIC | Age: 85
End: 2025-08-26
Payer: MEDICARE

## 2025-08-26 ENCOUNTER — RESULT REVIEW (OUTPATIENT)
Age: 85
End: 2025-08-26

## 2025-08-26 PROCEDURE — 99213 OFFICE O/P EST LOW 20 MIN: CPT | Mod: 25

## 2025-08-26 PROCEDURE — 28190 REMOVAL OF FOOT FOREIGN BODY: CPT | Mod: LT

## 2025-09-04 ENCOUNTER — RESULT REVIEW (OUTPATIENT)
Age: 85
End: 2025-09-04

## 2025-09-05 ENCOUNTER — TRANSCRIPTION ENCOUNTER (OUTPATIENT)
Age: 85
End: 2025-09-05

## 2025-09-05 ENCOUNTER — RESULT REVIEW (OUTPATIENT)
Age: 85
End: 2025-09-05

## 2025-09-17 ENCOUNTER — APPOINTMENT (OUTPATIENT)
Dept: CARDIOLOGY | Facility: CLINIC | Age: 85
End: 2025-09-17
Payer: MEDICARE

## 2025-09-17 VITALS
BODY MASS INDEX: 19.76 KG/M2 | WEIGHT: 154 LBS | OXYGEN SATURATION: 98 % | DIASTOLIC BLOOD PRESSURE: 65 MMHG | RESPIRATION RATE: 14 BRPM | HEIGHT: 74 IN | SYSTOLIC BLOOD PRESSURE: 143 MMHG | HEART RATE: 71 BPM

## 2025-09-17 DIAGNOSIS — I10 ESSENTIAL (PRIMARY) HYPERTENSION: ICD-10-CM

## 2025-09-17 DIAGNOSIS — I35.1 NONRHEUMATIC AORTIC (VALVE) INSUFFICIENCY: ICD-10-CM

## 2025-09-17 DIAGNOSIS — I50.20 UNSPECIFIED SYSTOLIC (CONGESTIVE) HEART FAILURE: ICD-10-CM

## 2025-09-17 DIAGNOSIS — I34.0 NONRHEUMATIC MITRAL (VALVE) INSUFFICIENCY: ICD-10-CM

## 2025-09-17 DIAGNOSIS — I25.10 ATHEROSCLEROTIC HEART DISEASE OF NATIVE CORONARY ARTERY W/OUT ANGINA PECTORIS: ICD-10-CM

## 2025-09-17 DIAGNOSIS — I48.91 UNSPECIFIED ATRIAL FIBRILLATION: ICD-10-CM

## 2025-09-17 PROCEDURE — 93000 ELECTROCARDIOGRAM COMPLETE: CPT

## 2025-09-17 PROCEDURE — 99214 OFFICE O/P EST MOD 30 MIN: CPT | Mod: 25

## 2025-09-18 PROBLEM — I35.1 NONRHEUMATIC AORTIC VALVE REGURGITATION: Status: ACTIVE | Noted: 2025-09-18

## 2025-09-25 PROBLEM — Z86.0100 HISTORY OF COLON POLYPS: Status: ACTIVE | Noted: 2025-09-25
